# Patient Record
Sex: MALE | Race: WHITE | NOT HISPANIC OR LATINO
[De-identification: names, ages, dates, MRNs, and addresses within clinical notes are randomized per-mention and may not be internally consistent; named-entity substitution may affect disease eponyms.]

---

## 2018-04-16 ENCOUNTER — APPOINTMENT (OUTPATIENT)
Dept: OTOLARYNGOLOGY | Facility: CLINIC | Age: 63
End: 2018-04-16
Payer: COMMERCIAL

## 2018-04-16 VITALS
DIASTOLIC BLOOD PRESSURE: 80 MMHG | HEART RATE: 73 BPM | WEIGHT: 315 LBS | SYSTOLIC BLOOD PRESSURE: 124 MMHG | HEIGHT: 72 IN | BODY MASS INDEX: 42.66 KG/M2

## 2018-04-16 DIAGNOSIS — J32.2 CHRONIC ETHMOIDAL SINUSITIS: ICD-10-CM

## 2018-04-16 PROCEDURE — 99243 OFF/OP CNSLTJ NEW/EST LOW 30: CPT | Mod: 25

## 2018-04-16 PROCEDURE — 31575 DIAGNOSTIC LARYNGOSCOPY: CPT

## 2018-04-16 RX ORDER — LEVOFLOXACIN 500 MG/1
500 TABLET, FILM COATED ORAL DAILY
Qty: 14 | Refills: 2 | Status: ACTIVE | COMMUNITY
Start: 2018-04-16 | End: 1900-01-01

## 2018-05-09 ENCOUNTER — APPOINTMENT (OUTPATIENT)
Dept: OTOLARYNGOLOGY | Facility: CLINIC | Age: 63
End: 2018-05-09
Payer: COMMERCIAL

## 2018-05-09 VITALS
DIASTOLIC BLOOD PRESSURE: 60 MMHG | HEART RATE: 67 BPM | HEIGHT: 72 IN | SYSTOLIC BLOOD PRESSURE: 92 MMHG | BODY MASS INDEX: 40.63 KG/M2 | WEIGHT: 300 LBS

## 2018-05-09 DIAGNOSIS — J34.2 DEVIATED NASAL SEPTUM: ICD-10-CM

## 2018-05-09 PROCEDURE — 99213 OFFICE O/P EST LOW 20 MIN: CPT

## 2018-05-09 RX ORDER — HYDROCODONE BITARTRATE AND HOMATROPINE METHYLBROMIDE 5; 1.5 MG/5ML; MG/5ML
5-1.5 SYRUP ORAL
Qty: 300 | Refills: 0 | Status: ACTIVE | COMMUNITY
Start: 2018-05-09 | End: 1900-01-01

## 2018-05-09 RX ORDER — ATORVASTATIN CALCIUM 40 MG/1
40 TABLET, FILM COATED ORAL
Refills: 0 | Status: ACTIVE | COMMUNITY

## 2018-05-09 RX ORDER — RIZATRIPTAN BENZOATE 10 MG/1
10 TABLET, ORALLY DISINTEGRATING ORAL
Refills: 0 | Status: ACTIVE | COMMUNITY

## 2018-05-09 RX ORDER — PROPRANOLOL HYDROCHLORIDE 120 MG/1
120 CAPSULE, EXTENDED RELEASE ORAL
Refills: 0 | Status: ACTIVE | COMMUNITY

## 2018-05-09 RX ORDER — LANSOPRAZOLE 30 MG/1
30 CAPSULE, DELAYED RELEASE ORAL
Refills: 0 | Status: ACTIVE | COMMUNITY

## 2018-05-09 RX ORDER — TAMOXIFEN CITRATE 20 MG/1
20 TABLET, FILM COATED ORAL
Refills: 0 | Status: ACTIVE | COMMUNITY

## 2018-05-09 RX ORDER — GABAPENTIN 100 MG/1
100 CAPSULE ORAL 4 TIMES DAILY
Qty: 1 | Refills: 3 | Status: ACTIVE | COMMUNITY
Start: 2018-05-09 | End: 1900-01-01

## 2018-06-08 RX ORDER — HYDROCODONE BITARTRATE AND HOMATROPINE METHYLBROMIDE 5; 1.5 MG/5ML; MG/5ML
5-1.5 SYRUP ORAL
Qty: 300 | Refills: 0 | Status: ACTIVE | COMMUNITY
Start: 2018-06-08 | End: 1900-01-01

## 2018-08-07 RX ORDER — HYDROCODONE BITARTRATE AND HOMATROPINE METHYLBROMIDE 5; 1.5 MG/5ML; MG/5ML
5-1.5 SYRUP ORAL
Qty: 300 | Refills: 0 | Status: ACTIVE | COMMUNITY
Start: 2018-08-07 | End: 1900-01-01

## 2018-09-11 RX ORDER — HYDROCODONE BITARTRATE AND HOMATROPINE METHYLBROMIDE 5; 1.5 MG/5ML; MG/5ML
5-1.5 SYRUP ORAL
Qty: 300 | Refills: 0 | Status: ACTIVE | COMMUNITY
Start: 2018-09-11 | End: 1900-01-01

## 2018-11-02 RX ORDER — HYDROCODONE BITARTRATE AND HOMATROPINE METHYLBROMIDE 5; 1.5 MG/5ML; MG/5ML
5-1.5 SYRUP ORAL
Qty: 300 | Refills: 0 | Status: ACTIVE | COMMUNITY
Start: 2018-11-02 | End: 1900-01-01

## 2019-03-10 ENCOUNTER — TRANSCRIPTION ENCOUNTER (OUTPATIENT)
Age: 64
End: 2019-03-10

## 2019-03-26 ENCOUNTER — APPOINTMENT (OUTPATIENT)
Dept: INTERNAL MEDICINE | Facility: CLINIC | Age: 64
End: 2019-03-26
Payer: COMMERCIAL

## 2019-03-26 VITALS
TEMPERATURE: 98.4 F | HEIGHT: 72 IN | HEART RATE: 76 BPM | SYSTOLIC BLOOD PRESSURE: 140 MMHG | WEIGHT: 315 LBS | DIASTOLIC BLOOD PRESSURE: 72 MMHG | RESPIRATION RATE: 16 BRPM | BODY MASS INDEX: 42.66 KG/M2 | OXYGEN SATURATION: 95 %

## 2019-03-26 DIAGNOSIS — R09.82 POSTNASAL DRIP: ICD-10-CM

## 2019-03-26 DIAGNOSIS — I10 ESSENTIAL (PRIMARY) HYPERTENSION: ICD-10-CM

## 2019-03-26 DIAGNOSIS — J45.909 UNSPECIFIED ASTHMA, UNCOMPLICATED: ICD-10-CM

## 2019-03-26 DIAGNOSIS — R93.89 ABNORMAL FINDINGS ON DIAGNOSTIC IMAGING OF OTHER SPECIFIED BODY STRUCTURES: ICD-10-CM

## 2019-03-26 DIAGNOSIS — E78.00 PURE HYPERCHOLESTEROLEMIA, UNSPECIFIED: ICD-10-CM

## 2019-03-26 DIAGNOSIS — R05 COUGH: ICD-10-CM

## 2019-03-26 DIAGNOSIS — R91.8 OTHER NONSPECIFIC ABNORMAL FINDING OF LUNG FIELD: ICD-10-CM

## 2019-03-26 DIAGNOSIS — G47.33 OBSTRUCTIVE SLEEP APNEA (ADULT) (PEDIATRIC): ICD-10-CM

## 2019-03-26 PROCEDURE — 94729 DIFFUSING CAPACITY: CPT

## 2019-03-26 PROCEDURE — 94060 EVALUATION OF WHEEZING: CPT

## 2019-03-26 PROCEDURE — G0447 BEHAVIOR COUNSEL OBESITY 15M: CPT

## 2019-03-26 PROCEDURE — 99244 OFF/OP CNSLTJ NEW/EST MOD 40: CPT | Mod: 25

## 2019-03-26 PROCEDURE — 94727 GAS DIL/WSHOT DETER LNG VOL: CPT

## 2019-03-26 PROCEDURE — ZZZZZ: CPT

## 2019-03-26 RX ORDER — METFORMIN HYDROCHLORIDE 500 MG/1
500 TABLET, COATED ORAL
Refills: 0 | Status: ACTIVE | COMMUNITY
Start: 2019-03-26

## 2019-03-26 RX ORDER — MOMETASONE FUROATE 220 UG/1
220 INHALANT RESPIRATORY (INHALATION)
Qty: 2 | Refills: 3 | Status: ACTIVE | COMMUNITY
Start: 2019-03-26 | End: 1900-01-01

## 2019-03-26 RX ORDER — AMLODIPINE BESYLATE 10 MG/1
10 TABLET ORAL
Refills: 0 | Status: ACTIVE | COMMUNITY
Start: 2019-03-26

## 2019-03-26 RX ORDER — FLUTICASONE PROPIONATE 50 UG/1
50 SPRAY, METERED NASAL DAILY
Qty: 1 | Refills: 2 | Status: ACTIVE | COMMUNITY
Start: 2019-03-26 | End: 1900-01-01

## 2019-03-26 RX ORDER — HYDROCHLOROTHIAZIDE 12.5 MG/1
12.5 CAPSULE ORAL
Refills: 0 | Status: ACTIVE | COMMUNITY
Start: 2019-03-26

## 2019-03-26 NOTE — REVIEW OF SYSTEMS
[Cough] : cough [Fever] : no fever [Chills] : no chills [Sputum] : not coughing up ~M sputum [Dyspnea] : no dyspnea [Dizziness] : no dizziness [Syncope] : no fainting

## 2019-03-26 NOTE — PROCEDURE
[FreeTextEntry1] : Full pulmonary function testing today is within normal limits with an FEV1 of 3.61 or 94% predicted.  diffusing capacity is normal.

## 2019-03-26 NOTE — CONSULT LETTER
[Dear  ___] : Dear ~KATHY, [Consult Letter:] : I had the pleasure of evaluating your patient, [unfilled]. [Please see my note below.] : Please see my note below. [Consult Closing:] : Thank you very much for allowing me to participate in the care of this patient.  If you have any questions, please do not hesitate to contact me. [Sincerely,] : Sincerely, [FreeTextEntry2] : Dr Geovanny Fletcher [FreeTextEntry3] : Randell Villasenor MD

## 2019-03-26 NOTE — ASSESSMENT
[FreeTextEntry1] : #1 small pulmonary nodules as noted above. 2 nodules seen in the left lateral lingular area measuring up to 5 mm appear to be seen on the PET CT scan of September 2014. Alvarado will have his next following CT scan of chest in 6 months. Return after to review results.\par \par #2 chronic cough for about 2 years. Patient has history of postnasal drip. he will use nasal saline spray p.r.n. Also begin Flonase 2 sprays per nostril b.i.d. x5 days and then q.d. Regarding his reflux, go on a GERD regimen, stay upright for at least 3 hours after eating before lying down. No bedtime snacks. Continue Prevacid. He will also begin Asmanex 220, 2 inhalations per day with gargle and for possible component of reactive airways or inflammatory airways disease. Wife instructed to smoke outdoors whenever possible. Will reserve oral steroid course at this time.\par \par #3 Abdomenal lymph nodes noted on CT scan of chest. Felt to be likely reactive. Refer patient to gastroenterology. Pt says will see Dr HARVINDER Rizvi who he has seen before.\par \par #4 obstructive sleep apnea. Continue CPAP treatment\par \par #5 morbid obesity. BMI 45.03. The patient was counseled obesity and instructed on a strict weight reduction diet. I recommended a low-fat, low-cholesterol, portion control, KTAERINE, no concentrated sweets diet, aiming to lose 2 pounds per week. Total time spent on counseling on obesity was 17 minutes.\par \par #6 hypertension. Patient's Olmesartan was recently discontinued 2 weeks ago. Patient states that his cough is improved during this time.\par \par #7 history of breast cancer 2014. No known recurrence. \par \par RV in 2 months.\par \par report sent to Dr Geovanny Fletcher.

## 2019-03-26 NOTE — PHYSICAL EXAM
[General Appearance - Well Developed] : well developed [Normal Appearance] : normal appearance [General Appearance - Well Nourished] : well nourished [No Deformities] : no deformities [General Appearance - In No Acute Distress] : no acute distress [Normal Conjunctiva] : the conjunctiva exhibited no abnormalities [Eyelids - No Xanthelasma] : the eyelids demonstrated no xanthelasmas [Normal Oropharynx] : normal oropharynx [Neck Appearance] : the appearance of the neck was normal [Neck Cervical Mass (___cm)] : no neck mass was observed [Jugular Venous Distention Increased] : there was no jugular-venous distention [Thyroid Diffuse Enlargement] : the thyroid was not enlarged [Thyroid Nodule] : there were no palpable thyroid nodules [Heart Rate And Rhythm] : heart rate and rhythm were normal [Heart Sounds] : normal S1 and S2 [Murmurs] : no murmurs present [Respiration, Rhythm And Depth] : normal respiratory rhythm and effort [Exaggerated Use Of Accessory Muscles For Inspiration] : no accessory muscle use [Auscultation Breath Sounds / Voice Sounds] : lungs were clear to auscultation bilaterally [Abdomen Soft] : soft [Abdomen Tenderness] : non-tender [Nail Clubbing] : no clubbing of the fingernails [Cyanosis, Localized] : no localized cyanosis [Skin Turgor] : normal skin turgor [] : no rash [Impaired Insight] : insight and judgment were intact [Affect] : the affect was normal [FreeTextEntry1] : alert

## 2019-03-26 NOTE — HISTORY OF PRESENT ILLNESS
[FreeTextEntry1] : This is a 63-year-old male who is seen today in pulmonary consultation. He has had a dry cough for approximately 2 years. he says he rarely has wheezing, is not bringing up any sputum, not having any hemoptysis. He is not noting dyspnea on exertion. He is able to walk 10 blocks and climb 2 flights of stairs all right. He is a nonsmoker. His wife smokes and he has had secondhand smoke exposure. Has no known history of asthma or COPD. He does have a history of postnasal drip. Tells me he had allergy testing about 4 years ago and was told that all the tests were okay.  He does have a history of GERD and is maintained on lansoprazole 30 mg per day. He does have snacks at 8 PM and lies down to sleep at 10 PM.  He was instucted to increase his interval to 3 hours between eating and lying down..\par \par He does have obstructive sleep apnea and wears with CPAP every night.\par \par He had a CT scan of chest on 3/12/19 which showed 2  nodules in the lateral left lingular area which were up to 5 mm in diameter. His previous PET/CT scan of September 2014 showed what appear to be similar nodules in the same area. Also on the current CT were tiny punctate nodules in the peripheral right middle lobe, peripheral right upper lobe, and in the peripheral right lower lobe. The upper abdomen on the current CT also showed redemonstration of several similar-appearing peripancreatic periportal and periceliac artery lymph node the largest of which was 2.7 x 1.0 cm at the periportal area and thought to likely be reactive.\par \par Patient states that his olmesartan was discontinued 2 weeks ago and that his cough seems to be better since that time. It is however not resolved fully.\par \par The patient has a history of right breast cancer treated with mastectomy. He tells me he received no radiation therapy or chemotherapy and has no recurrence. He is maintained on Tamoxifen.

## 2019-05-21 ENCOUNTER — APPOINTMENT (OUTPATIENT)
Dept: INTERNAL MEDICINE | Facility: CLINIC | Age: 64
End: 2019-05-21

## 2020-08-22 DIAGNOSIS — Z01.818 ENCOUNTER FOR OTHER PREPROCEDURAL EXAMINATION: ICD-10-CM

## 2020-08-29 ENCOUNTER — APPOINTMENT (OUTPATIENT)
Dept: DISASTER EMERGENCY | Facility: CLINIC | Age: 65
End: 2020-08-29

## 2020-08-30 LAB — SARS-COV-2 N GENE NPH QL NAA+PROBE: NOT DETECTED

## 2020-09-01 ENCOUNTER — RESULT REVIEW (OUTPATIENT)
Age: 65
End: 2020-09-01

## 2020-09-01 ENCOUNTER — OUTPATIENT (OUTPATIENT)
Dept: OUTPATIENT SERVICES | Facility: HOSPITAL | Age: 65
LOS: 1 days | Discharge: HOME | End: 2020-09-01
Payer: COMMERCIAL

## 2020-09-01 VITALS
TEMPERATURE: 97 F | WEIGHT: 315 LBS | OXYGEN SATURATION: 99 % | SYSTOLIC BLOOD PRESSURE: 149 MMHG | RESPIRATION RATE: 19 BRPM | HEART RATE: 73 BPM | DIASTOLIC BLOOD PRESSURE: 87 MMHG | HEIGHT: 72 IN

## 2020-09-01 DIAGNOSIS — D64.9 ANEMIA, UNSPECIFIED: ICD-10-CM

## 2020-09-01 PROCEDURE — 88312 SPECIAL STAINS GROUP 1: CPT

## 2020-09-01 PROCEDURE — C1889: CPT

## 2020-09-01 PROCEDURE — 88305 TISSUE EXAM BY PATHOLOGIST: CPT | Mod: 26

## 2020-09-01 PROCEDURE — 88305 TISSUE EXAM BY PATHOLOGIST: CPT

## 2020-09-01 PROCEDURE — 88312 SPECIAL STAINS GROUP 1: CPT | Mod: 26

## 2020-09-01 NOTE — ASU PATIENT PROFILE, ADULT - PMH
Diabetes mellitus    GERD (gastroesophageal reflux disease)    Hyperlipidemia    Hypertension    Sleep apnea

## 2020-09-04 DIAGNOSIS — E66.01 MORBID (SEVERE) OBESITY DUE TO EXCESS CALORIES: ICD-10-CM

## 2020-09-04 DIAGNOSIS — Z79.84 LONG TERM (CURRENT) USE OF ORAL HYPOGLYCEMIC DRUGS: ICD-10-CM

## 2020-09-04 DIAGNOSIS — D12.2 BENIGN NEOPLASM OF ASCENDING COLON: ICD-10-CM

## 2020-09-04 DIAGNOSIS — E11.9 TYPE 2 DIABETES MELLITUS WITHOUT COMPLICATIONS: ICD-10-CM

## 2020-09-04 DIAGNOSIS — G47.30 SLEEP APNEA, UNSPECIFIED: ICD-10-CM

## 2020-09-04 DIAGNOSIS — K31.7 POLYP OF STOMACH AND DUODENUM: ICD-10-CM

## 2020-09-04 DIAGNOSIS — K21.9 GASTRO-ESOPHAGEAL REFLUX DISEASE WITHOUT ESOPHAGITIS: ICD-10-CM

## 2020-09-04 DIAGNOSIS — K62.1 RECTAL POLYP: ICD-10-CM

## 2020-09-04 DIAGNOSIS — I10 ESSENTIAL (PRIMARY) HYPERTENSION: ICD-10-CM

## 2020-09-04 DIAGNOSIS — D64.9 ANEMIA, UNSPECIFIED: ICD-10-CM

## 2020-09-04 DIAGNOSIS — E78.5 HYPERLIPIDEMIA, UNSPECIFIED: ICD-10-CM

## 2020-09-04 DIAGNOSIS — K57.30 DIVERTICULOSIS OF LARGE INTESTINE WITHOUT PERFORATION OR ABSCESS WITHOUT BLEEDING: ICD-10-CM

## 2021-03-06 ENCOUNTER — TRANSCRIPTION ENCOUNTER (OUTPATIENT)
Age: 66
End: 2021-03-06

## 2021-07-26 ENCOUNTER — EMERGENCY (EMERGENCY)
Facility: HOSPITAL | Age: 66
LOS: 0 days | Discharge: HOME | End: 2021-07-26
Attending: EMERGENCY MEDICINE
Payer: COMMERCIAL

## 2021-07-26 VITALS
DIASTOLIC BLOOD PRESSURE: 78 MMHG | RESPIRATION RATE: 19 BRPM | OXYGEN SATURATION: 100 % | HEART RATE: 66 BPM | TEMPERATURE: 98 F | SYSTOLIC BLOOD PRESSURE: 133 MMHG

## 2021-07-26 VITALS — HEIGHT: 72 IN | WEIGHT: 315 LBS

## 2021-07-26 DIAGNOSIS — I10 ESSENTIAL (PRIMARY) HYPERTENSION: ICD-10-CM

## 2021-07-26 DIAGNOSIS — M54.5 LOW BACK PAIN: ICD-10-CM

## 2021-07-26 DIAGNOSIS — G47.30 SLEEP APNEA, UNSPECIFIED: ICD-10-CM

## 2021-07-26 DIAGNOSIS — Z79.899 OTHER LONG TERM (CURRENT) DRUG THERAPY: ICD-10-CM

## 2021-07-26 DIAGNOSIS — Z91.018 ALLERGY TO OTHER FOODS: ICD-10-CM

## 2021-07-26 DIAGNOSIS — E11.9 TYPE 2 DIABETES MELLITUS WITHOUT COMPLICATIONS: ICD-10-CM

## 2021-07-26 DIAGNOSIS — Z88.8 ALLERGY STATUS TO OTHER DRUGS, MEDICAMENTS AND BIOLOGICAL SUBSTANCES: ICD-10-CM

## 2021-07-26 DIAGNOSIS — Z79.84 LONG TERM (CURRENT) USE OF ORAL HYPOGLYCEMIC DRUGS: ICD-10-CM

## 2021-07-26 DIAGNOSIS — E78.5 HYPERLIPIDEMIA, UNSPECIFIED: ICD-10-CM

## 2021-07-26 DIAGNOSIS — K21.9 GASTRO-ESOPHAGEAL REFLUX DISEASE WITHOUT ESOPHAGITIS: ICD-10-CM

## 2021-07-26 PROCEDURE — 99284 EMERGENCY DEPT VISIT MOD MDM: CPT

## 2021-07-26 PROCEDURE — 99283 EMERGENCY DEPT VISIT LOW MDM: CPT

## 2021-07-26 RX ORDER — IBUPROFEN 200 MG
600 TABLET ORAL ONCE
Refills: 0 | Status: COMPLETED | OUTPATIENT
Start: 2021-07-26 | End: 2021-07-26

## 2021-07-26 RX ORDER — DIAZEPAM 5 MG
1 TABLET ORAL
Qty: 9 | Refills: 0
Start: 2021-07-26

## 2021-07-26 RX ORDER — IBUPROFEN 200 MG
1 TABLET ORAL
Qty: 40 | Refills: 0
Start: 2021-07-26

## 2021-07-26 RX ORDER — DIAZEPAM 5 MG
5 TABLET ORAL ONCE
Refills: 0 | Status: DISCONTINUED | OUTPATIENT
Start: 2021-07-26 | End: 2021-07-26

## 2021-07-26 RX ADMIN — Medication 600 MILLIGRAM(S): at 07:51

## 2021-07-26 RX ADMIN — Medication 5 MILLIGRAM(S): at 07:51

## 2021-07-26 NOTE — ED PROVIDER NOTE - PATIENT PORTAL LINK FT
You can access the FollowMyHealth Patient Portal offered by [facility] by registering at the following website: [email or web address]. By joining [employer]’s FollowMyHealth portal, you will also be able to view your health information using other applications (apps) compatible with our system.

## 2021-07-26 NOTE — ED PROVIDER NOTE - OBJECTIVE STATEMENT
66M hx HTN c/o progressive low back pain x 3d. No weakness/paresth. No bowel/bladder changes. NO fever/chills. No red flag symptoms. No recent fall/injury  Took one dose OTC meds yesterday without relief. Has seen chiropractor for similar pain ~1 year ago.

## 2021-07-26 NOTE — ED PROVIDER NOTE - NSFOLLOWUPINSTRUCTIONS_ED_ALL_ED_FT
Follow-up with your regular physician  Return with any persistent/worsening symptoms.     Back Pain    Back pain is very common in adults. The cause of back pain is rarely dangerous and the pain often gets better over time. The cause of your back pain may not be known and may include strain of muscles or ligaments, degeneration of the spinal disks, or arthritis. Occasionally the pain may radiate down your leg(s). Over-the-counter medicines to reduce pain and inflammation are often the most helpful. Stretching and remaining active frequently helps the healing process.     SEEK IMMEDIATE MEDICAL CARE IF YOU HAVE ANY OF THE FOLLOWING SYMPTOMS: bowel or bladder control problems, unusual weakness or numbness in your arms or legs, nausea or vomiting, abdominal pain, fever, dizziness/lightheadedness.

## 2021-07-26 NOTE — ED PROVIDER NOTE - CARE PROVIDER_API CALL
Geovanny Fletcher  CARDIOVASCULAR DISEASE  [address], [facility] [address]  [place] [address]  Phone: [phone]  Fax: [phone]  Follow Up Time:

## 2021-10-06 PROBLEM — I10 ESSENTIAL HYPERTENSION: Status: ACTIVE | Noted: 2019-03-26

## 2023-05-03 ENCOUNTER — OUTPATIENT (OUTPATIENT)
Dept: OUTPATIENT SERVICES | Facility: HOSPITAL | Age: 68
LOS: 1 days | End: 2023-05-03
Payer: COMMERCIAL

## 2023-05-03 VITALS
WEIGHT: 315 LBS | HEIGHT: 72 IN | SYSTOLIC BLOOD PRESSURE: 132 MMHG | RESPIRATION RATE: 16 BRPM | OXYGEN SATURATION: 98 % | DIASTOLIC BLOOD PRESSURE: 78 MMHG | TEMPERATURE: 97 F | HEART RATE: 62 BPM

## 2023-05-03 DIAGNOSIS — Z29.9 ENCOUNTER FOR PROPHYLACTIC MEASURES, UNSPECIFIED: ICD-10-CM

## 2023-05-03 DIAGNOSIS — M48.02 SPINAL STENOSIS, CERVICAL REGION: ICD-10-CM

## 2023-05-03 DIAGNOSIS — Z98.890 OTHER SPECIFIED POSTPROCEDURAL STATES: Chronic | ICD-10-CM

## 2023-05-03 DIAGNOSIS — Z90.11 ACQUIRED ABSENCE OF RIGHT BREAST AND NIPPLE: Chronic | ICD-10-CM

## 2023-05-03 DIAGNOSIS — Z01.818 ENCOUNTER FOR OTHER PREPROCEDURAL EXAMINATION: ICD-10-CM

## 2023-05-03 LAB
A1C WITH ESTIMATED AVERAGE GLUCOSE RESULT: 6.6 % — HIGH (ref 4–5.6)
ABO RH CONFIRMATION: SIGNIFICANT CHANGE UP
ALBUMIN SERPL ELPH-MCNC: 3.9 G/DL — SIGNIFICANT CHANGE UP (ref 3.3–5)
ALP SERPL-CCNC: 76 U/L — SIGNIFICANT CHANGE UP (ref 40–120)
ALT FLD-CCNC: 80 U/L — HIGH (ref 12–78)
ANION GAP SERPL CALC-SCNC: 2 MMOL/L — LOW (ref 5–17)
ANISOCYTOSIS BLD QL: SLIGHT — SIGNIFICANT CHANGE UP
APPEARANCE UR: CLEAR — SIGNIFICANT CHANGE UP
APTT BLD: 32.9 SEC — SIGNIFICANT CHANGE UP (ref 27.5–35.5)
AST SERPL-CCNC: 54 U/L — HIGH (ref 15–37)
BASOPHILS # BLD AUTO: 0.05 K/UL — SIGNIFICANT CHANGE UP (ref 0–0.2)
BASOPHILS NFR BLD AUTO: 0.7 % — SIGNIFICANT CHANGE UP (ref 0–2)
BILIRUB SERPL-MCNC: 0.7 MG/DL — SIGNIFICANT CHANGE UP (ref 0.2–1.2)
BILIRUB UR-MCNC: NEGATIVE — SIGNIFICANT CHANGE UP
BLD GP AB SCN SERPL QL: SIGNIFICANT CHANGE UP
BUN SERPL-MCNC: 19 MG/DL — SIGNIFICANT CHANGE UP (ref 7–23)
CALCIUM SERPL-MCNC: 9.2 MG/DL — SIGNIFICANT CHANGE UP (ref 8.5–10.1)
CHLORIDE SERPL-SCNC: 110 MMOL/L — HIGH (ref 96–108)
CO2 SERPL-SCNC: 27 MMOL/L — SIGNIFICANT CHANGE UP (ref 22–31)
COLOR SPEC: YELLOW — SIGNIFICANT CHANGE UP
CREAT SERPL-MCNC: 1.08 MG/DL — SIGNIFICANT CHANGE UP (ref 0.5–1.3)
DIFF PNL FLD: NEGATIVE — SIGNIFICANT CHANGE UP
EGFR: 75 ML/MIN/1.73M2 — SIGNIFICANT CHANGE UP
ELLIPTOCYTES BLD QL SMEAR: SLIGHT — SIGNIFICANT CHANGE UP
EOSINOPHIL # BLD AUTO: 0.27 K/UL — SIGNIFICANT CHANGE UP (ref 0–0.5)
EOSINOPHIL NFR BLD AUTO: 3.7 % — SIGNIFICANT CHANGE UP (ref 0–6)
ESTIMATED AVERAGE GLUCOSE: 143 MG/DL — HIGH (ref 68–114)
GLUCOSE SERPL-MCNC: 123 MG/DL — HIGH (ref 70–99)
GLUCOSE UR QL: 1000 MG/DL
HCT VFR BLD CALC: 48.3 % — SIGNIFICANT CHANGE UP (ref 39–50)
HGB BLD-MCNC: 14.8 G/DL — SIGNIFICANT CHANGE UP (ref 13–17)
IMM GRANULOCYTES NFR BLD AUTO: 0.3 % — SIGNIFICANT CHANGE UP (ref 0–0.9)
INR BLD: 1.11 RATIO — SIGNIFICANT CHANGE UP (ref 0.88–1.16)
KETONES UR-MCNC: NEGATIVE — SIGNIFICANT CHANGE UP
LEUKOCYTE ESTERASE UR-ACNC: NEGATIVE — SIGNIFICANT CHANGE UP
LYMPHOCYTES # BLD AUTO: 1.88 K/UL — SIGNIFICANT CHANGE UP (ref 1–3.3)
LYMPHOCYTES # BLD AUTO: 25.9 % — SIGNIFICANT CHANGE UP (ref 13–44)
MANUAL SMEAR VERIFICATION: SIGNIFICANT CHANGE UP
MCHC RBC-ENTMCNC: 24.5 PG — LOW (ref 27–34)
MCHC RBC-ENTMCNC: 30.6 GM/DL — LOW (ref 32–36)
MCV RBC AUTO: 79.8 FL — LOW (ref 80–100)
MONOCYTES # BLD AUTO: 0.68 K/UL — SIGNIFICANT CHANGE UP (ref 0–0.9)
MONOCYTES NFR BLD AUTO: 9.4 % — SIGNIFICANT CHANGE UP (ref 2–14)
MRSA PCR RESULT.: SIGNIFICANT CHANGE UP
NEUTROPHILS # BLD AUTO: 4.35 K/UL — SIGNIFICANT CHANGE UP (ref 1.8–7.4)
NEUTROPHILS NFR BLD AUTO: 60 % — SIGNIFICANT CHANGE UP (ref 43–77)
NITRITE UR-MCNC: NEGATIVE — SIGNIFICANT CHANGE UP
PH UR: 5 — SIGNIFICANT CHANGE UP (ref 5–8)
PLAT MORPH BLD: NORMAL — SIGNIFICANT CHANGE UP
PLATELET # BLD AUTO: 97 K/UL — LOW (ref 150–400)
POIKILOCYTOSIS BLD QL AUTO: SLIGHT — SIGNIFICANT CHANGE UP
POTASSIUM SERPL-MCNC: 4.3 MMOL/L — SIGNIFICANT CHANGE UP (ref 3.5–5.3)
POTASSIUM SERPL-SCNC: 4.3 MMOL/L — SIGNIFICANT CHANGE UP (ref 3.5–5.3)
PROT SERPL-MCNC: 8.1 GM/DL — SIGNIFICANT CHANGE UP (ref 6–8.3)
PROT UR-MCNC: NEGATIVE — SIGNIFICANT CHANGE UP
PROTHROM AB SERPL-ACNC: 12.9 SEC — SIGNIFICANT CHANGE UP (ref 10.5–13.4)
RBC # BLD: 6.05 M/UL — HIGH (ref 4.2–5.8)
RBC # FLD: 17.6 % — HIGH (ref 10.3–14.5)
RBC BLD AUTO: ABNORMAL
S AUREUS DNA NOSE QL NAA+PROBE: SIGNIFICANT CHANGE UP
SODIUM SERPL-SCNC: 139 MMOL/L — SIGNIFICANT CHANGE UP (ref 135–145)
SP GR SPEC: 1.02 — SIGNIFICANT CHANGE UP (ref 1.01–1.02)
UROBILINOGEN FLD QL: NEGATIVE — SIGNIFICANT CHANGE UP
WBC # BLD: 7.25 K/UL — SIGNIFICANT CHANGE UP (ref 3.8–10.5)
WBC # FLD AUTO: 7.25 K/UL — SIGNIFICANT CHANGE UP (ref 3.8–10.5)

## 2023-05-03 PROCEDURE — 87086 URINE CULTURE/COLONY COUNT: CPT

## 2023-05-03 PROCEDURE — 93010 ELECTROCARDIOGRAM REPORT: CPT

## 2023-05-03 PROCEDURE — 85610 PROTHROMBIN TIME: CPT

## 2023-05-03 PROCEDURE — 87641 MR-STAPH DNA AMP PROBE: CPT

## 2023-05-03 PROCEDURE — 93005 ELECTROCARDIOGRAM TRACING: CPT

## 2023-05-03 PROCEDURE — 81003 URINALYSIS AUTO W/O SCOPE: CPT

## 2023-05-03 PROCEDURE — 85025 COMPLETE CBC W/AUTO DIFF WBC: CPT

## 2023-05-03 PROCEDURE — 86850 RBC ANTIBODY SCREEN: CPT

## 2023-05-03 PROCEDURE — 87640 STAPH A DNA AMP PROBE: CPT

## 2023-05-03 PROCEDURE — 36415 COLL VENOUS BLD VENIPUNCTURE: CPT

## 2023-05-03 PROCEDURE — 86901 BLOOD TYPING SEROLOGIC RH(D): CPT

## 2023-05-03 PROCEDURE — 80053 COMPREHEN METABOLIC PANEL: CPT

## 2023-05-03 PROCEDURE — 86900 BLOOD TYPING SEROLOGIC ABO: CPT

## 2023-05-03 PROCEDURE — 71046 X-RAY EXAM CHEST 2 VIEWS: CPT | Mod: 26

## 2023-05-03 PROCEDURE — 85730 THROMBOPLASTIN TIME PARTIAL: CPT

## 2023-05-03 PROCEDURE — 71046 X-RAY EXAM CHEST 2 VIEWS: CPT

## 2023-05-03 PROCEDURE — 83036 HEMOGLOBIN GLYCOSYLATED A1C: CPT

## 2023-05-03 PROCEDURE — 99214 OFFICE O/P EST MOD 30 MIN: CPT | Mod: 25

## 2023-05-03 RX ORDER — HYDROCHLOROTHIAZIDE 25 MG
0 TABLET ORAL
Qty: 0 | Refills: 0 | DISCHARGE

## 2023-05-03 RX ORDER — METFORMIN HYDROCHLORIDE 850 MG/1
1 TABLET ORAL
Refills: 0 | DISCHARGE

## 2023-05-03 RX ORDER — LANSOPRAZOLE 15 MG/1
0 CAPSULE, DELAYED RELEASE ORAL
Qty: 0 | Refills: 0 | DISCHARGE

## 2023-05-03 RX ORDER — PROPRANOLOL HCL 160 MG
0 CAPSULE, EXTENDED RELEASE 24HR ORAL
Qty: 0 | Refills: 0 | DISCHARGE

## 2023-05-03 RX ORDER — METFORMIN HYDROCHLORIDE 850 MG/1
1 TABLET ORAL
Qty: 0 | Refills: 0 | DISCHARGE

## 2023-05-03 RX ORDER — LEVOTHYROXINE SODIUM 125 MCG
0 TABLET ORAL
Qty: 0 | Refills: 0 | DISCHARGE

## 2023-05-03 RX ORDER — FUROSEMIDE 40 MG
0 TABLET ORAL
Qty: 0 | Refills: 0 | DISCHARGE

## 2023-05-03 RX ORDER — PROPRANOLOL HCL 160 MG
1 CAPSULE, EXTENDED RELEASE 24HR ORAL
Refills: 0 | DISCHARGE

## 2023-05-03 RX ORDER — FERROUS SULFATE 325(65) MG
1 TABLET ORAL
Qty: 0 | Refills: 0 | DISCHARGE

## 2023-05-03 NOTE — H&P PST ADULT - HISTORY OF PRESENT ILLNESS
68 y.o WD, WN male presents to PST  68 y.o WD, WN male presents to PST with hx of neck pain. Patient reports waking up back in November 2022 with severe neck pain, radiating down left shoulder and arm. He was evaluated with diagnostics revealing cervical herniated disc, stenosis and per patient bone spurs. He has tried conservative treatment without relief. Patient is now opting for surgical intervention. Scheduled for Anterior Cervical Discectomy and Fusion, C4/5, C5/6, C6/7, Anterior Plating , Prosthetic Spacers and Local Autograft

## 2023-05-03 NOTE — H&P PST ADULT - ASSESSMENT
68 y.o male scheduled for Anterior Cervical Discectomy and Fusion, C4/5, C5/6, C6/7, Anterior Plating , Prosthetic Spacers and Local Autograft  Plan  1. Stop all NSAIDS, herbal supplements and vitamins for 7 days.  2. NPO at midnight.  3. Take the following medications ( ) with small sips of water on the morning of your procedure/surgery.  4. Use EZ sponges as directed  5. Use mupirocin as directed  6. Labs, EKG, CXR a sper surgeon  7. PMD/cardiologist visit for optimization prior to surgery as per surgeon.     68 y.o male scheduled for Anterior Cervical Discectomy and Fusion, C4/5, C5/6, C6/7, Anterior Plating , Prosthetic Spacers and Local Autograft  Plan  1. Stop all NSAIDS, herbal supplements and vitamins for 7 days.  2. NPO at midnight.  3. Take the following medications --L-Thyroxine  with small sips of water on the morning of your procedure/surgery.  4. Use EZ sponges as directed  5. Use mupirocin as directed  6. Labs, EKG, CXR a sper surgeon  7. PMD/cardiologist visit for optimization prior to surgery as per surgeon.     68 y.o male scheduled for Anterior Cervical Discectomy and Fusion, C4/5, C5/6, C6/7, Anterior Plating , Prosthetic Spacers and Local Autograft  Plan  1. Stop all NSAIDS, herbal supplements and vitamins for 7 days. Hold Jardiance 3 days prior to surgery .   2. NPO at midnight.  3. Take the following medications --L-Thyroxine--  with small sips of water on the morning of your procedure/surgery.  4. Use EZ sponges as directed  5. Use mupirocin as directed  6. Labs, EKG, CXR as per surgeon  7. PMD/cardiologist JESS Fletcher visit for optimization prior to surgery as per surgeon.  8. Preprocedure education provided    CAPRINI SCORE    AGE RELATED RISK FACTORS                                                       MOBILITY RELATED FACTORS  [ ] Age 41-60 years                                            (1 Point)                  [ ] Bed rest                                                        (1 Point)  [x Age: 61-74 years                                           (2 Points)                [ ] Plaster cast                                                   (2 Points)  [ ] Age= 75 years                                              (3 Points)                 [ ] Bed bound for more than 72 hours                   (2 Points)    DISEASE RELATED RISK FACTORS                                               GENDER SPECIFIC FACTORS  [x ] Edema in the lower extremities                       (1 Point)                  [ ] Pregnancy                                                     (1 Point)  [ ] Varicose veins                                               (1 Point)                  [ ] Post-partum < 6 weeks                                   (1 Point)             [x ] BMI > 25 Kg/m2                                            (1 Point)                  [ ] Hormonal therapy  or oral contraception            (1 Point)                 [ ] Sepsis (in the previous month)                        (1 Point)                  [ ] History of pregnancy complications  [ ] Pneumonia or serious lung disease                                               [ ] Unexplained or recurrent                       (1 Point)           (in the previous month)                               (1 Point)  [ ] Abnormal pulmonary function test                     (1 Point)                 SURGERY RELATED RISK FACTORS  [ ] Acute myocardial infarction                              (1 Point)                 [ ]  Section                                            (1 Point)  [ ] Congestive heart failure (in the previous month)  (1 Point)                 [ ] Minor surgery                                                 (1 Point)   [ ] Inflammatory bowel disease                             (1 Point)                 [ ] Arthroscopic surgery                                        (2 Points)  [ ] Central venous access                                    (2 Points)                [ x] General surgery lasting more than 45 minutes   (2 Points)       [ ] Stroke (in the previous month)                          (5 Points)               [ ] Elective arthroplasty                                        (5 Points)                                                                                                                                               HEMATOLOGY RELATED FACTORS                                                 TRAUMA RELATED RISK FACTORS  [ ] Prior episodes of VTE                                     (3 Points)                 [ ] Fracture of the hip, pelvis, or leg                       (5 Points)  [ ] Positive family history for VTE                         (3 Points)                 [ ] Acute spinal cord injury (in the previous month)  (5 Points)  [ ] Prothrombin 80090 A                                      (3 Points)                 [ ] Paralysis  (less than 1 month)                          (5 Points)  [ ] Factor V Leiden                                             (3 Points)                 [ ] Multiple Trauma within 1 month                         (5 Points)  [ ] Lupus anticoagulants                                     (3 Points)                                                           [ ] Anticardiolipin antibodies                                (3 Points)                                                       [ ] High homocysteine in the blood                      (3 Points)                                             [ ] Other congenital or acquired thrombophilia       (3 Points)                                                [ ] Heparin induced thrombocytopenia                  (3 Points)                                          Total Score [      6    ]    The Caprini score indicates that this patient is at high risk for a VTE event (score > = 6).    Surgical patients in this group will benefit from both pharmacologic prophylaxis and intermittent compression devices.    The surgical team will determine the balance between VTE risk and bleeding risk, and other clinical considerations.

## 2023-05-03 NOTE — H&P PST ADULT - NSICDXPASTMEDICALHX_GEN_ALL_CORE_FT
PAST MEDICAL HISTORY:  Cervical herniated disc     Cervical radiculopathy     Cervical spinal stenosis     GERD (gastroesophageal reflux disease)     Hyperlipidemia     Hypertension     Sleep apnea     Type II diabetes mellitus      PAST MEDICAL HISTORY:  Benign essential tremor     Breast cancer, right     Cervical herniated disc     Cervical radiculopathy     Cervical spinal stenosis     Chronic coughing     GERD (gastroesophageal reflux disease)     Hearing loss     Hyperlipidemia     Hypertension     Numbness of left hand     Obesity, morbid, BMI 40.0-49.9     Sleep apnea     Type II diabetes mellitus

## 2023-05-03 NOTE — H&P PST ADULT - CARDIOVASCULAR COMMENTS
preop evaluation done with JESS Fletcher (PCP/Cardiology) for optimization for surgery; HTN off medication for "few years"

## 2023-05-03 NOTE — H&P PST ADULT - NSICDXPASTSURGICALHX_GEN_ALL_CORE_FT
PAST SURGICAL HISTORY:  No significant past surgical history      PAST SURGICAL HISTORY:  H/O colonoscopy     History of carpal tunnel surgery of left wrist     History of esophagogastroduodenoscopy (EGD)     History of excision of pilonidal cyst     History of right mastectomy

## 2023-05-04 DIAGNOSIS — M48.02 SPINAL STENOSIS, CERVICAL REGION: ICD-10-CM

## 2023-05-04 DIAGNOSIS — Z01.818 ENCOUNTER FOR OTHER PREPROCEDURAL EXAMINATION: ICD-10-CM

## 2023-05-04 PROBLEM — E66.01 MORBID (SEVERE) OBESITY DUE TO EXCESS CALORIES: Chronic | Status: ACTIVE | Noted: 2023-05-03

## 2023-05-04 PROBLEM — R20.0 ANESTHESIA OF SKIN: Chronic | Status: ACTIVE | Noted: 2023-05-03

## 2023-05-04 PROBLEM — M54.12 RADICULOPATHY, CERVICAL REGION: Chronic | Status: ACTIVE | Noted: 2023-05-03

## 2023-05-04 PROBLEM — G25.0 ESSENTIAL TREMOR: Chronic | Status: ACTIVE | Noted: 2023-05-03

## 2023-05-04 PROBLEM — M50.20 OTHER CERVICAL DISC DISPLACEMENT, UNSPECIFIED CERVICAL REGION: Chronic | Status: ACTIVE | Noted: 2023-05-03

## 2023-05-04 PROBLEM — E11.9 TYPE 2 DIABETES MELLITUS WITHOUT COMPLICATIONS: Chronic | Status: ACTIVE | Noted: 2023-05-03

## 2023-05-04 PROBLEM — C50.911 MALIGNANT NEOPLASM OF UNSPECIFIED SITE OF RIGHT FEMALE BREAST: Chronic | Status: ACTIVE | Noted: 2023-05-03

## 2023-05-04 PROBLEM — H91.90 UNSPECIFIED HEARING LOSS, UNSPECIFIED EAR: Chronic | Status: ACTIVE | Noted: 2023-05-03

## 2023-05-04 PROBLEM — R05.3 CHRONIC COUGH: Chronic | Status: ACTIVE | Noted: 2023-05-03

## 2023-05-05 LAB
CULTURE RESULTS: SIGNIFICANT CHANGE UP
SPECIMEN SOURCE: SIGNIFICANT CHANGE UP

## 2023-05-09 ENCOUNTER — OUTPATIENT (OUTPATIENT)
Dept: INPATIENT UNIT | Facility: HOSPITAL | Age: 68
LOS: 1 days | Discharge: HOME | End: 2023-05-09
Payer: COMMERCIAL

## 2023-05-09 VITALS
OXYGEN SATURATION: 97 % | TEMPERATURE: 98 F | RESPIRATION RATE: 16 BRPM | WEIGHT: 315 LBS | DIASTOLIC BLOOD PRESSURE: 66 MMHG | SYSTOLIC BLOOD PRESSURE: 129 MMHG | HEART RATE: 60 BPM

## 2023-05-09 DIAGNOSIS — Z98.890 OTHER SPECIFIED POSTPROCEDURAL STATES: Chronic | ICD-10-CM

## 2023-05-09 DIAGNOSIS — Z90.11 ACQUIRED ABSENCE OF RIGHT BREAST AND NIPPLE: Chronic | ICD-10-CM

## 2023-05-09 DIAGNOSIS — M48.02 SPINAL STENOSIS, CERVICAL REGION: ICD-10-CM

## 2023-05-09 LAB
GLUCOSE BLDC GLUCOMTR-MCNC: 135 MG/DL — HIGH (ref 70–99)
GLUCOSE BLDC GLUCOMTR-MCNC: 137 MG/DL — HIGH (ref 70–99)
GLUCOSE BLDC GLUCOMTR-MCNC: 138 MG/DL — HIGH (ref 70–99)
GLUCOSE BLDC GLUCOMTR-MCNC: 196 MG/DL — HIGH (ref 70–99)

## 2023-05-09 PROCEDURE — 82962 GLUCOSE BLOOD TEST: CPT

## 2023-05-09 PROCEDURE — 85027 COMPLETE CBC AUTOMATED: CPT

## 2023-05-09 PROCEDURE — 84484 ASSAY OF TROPONIN QUANT: CPT

## 2023-05-09 PROCEDURE — 97162 PT EVAL MOD COMPLEX 30 MIN: CPT | Mod: GP

## 2023-05-09 PROCEDURE — C1889: CPT

## 2023-05-09 PROCEDURE — 97116 GAIT TRAINING THERAPY: CPT | Mod: GP

## 2023-05-09 PROCEDURE — 88304 TISSUE EXAM BY PATHOLOGIST: CPT

## 2023-05-09 PROCEDURE — 36415 COLL VENOUS BLD VENIPUNCTURE: CPT

## 2023-05-09 PROCEDURE — 88304 TISSUE EXAM BY PATHOLOGIST: CPT | Mod: 26

## 2023-05-09 PROCEDURE — 93005 ELECTROCARDIOGRAM TRACING: CPT

## 2023-05-09 PROCEDURE — 80048 BASIC METABOLIC PNL TOTAL CA: CPT

## 2023-05-09 PROCEDURE — C1713: CPT

## 2023-05-09 PROCEDURE — 76000 FLUOROSCOPY <1 HR PHYS/QHP: CPT

## 2023-05-09 RX ORDER — CELECOXIB 200 MG/1
200 CAPSULE ORAL ONCE
Refills: 0 | Status: COMPLETED | OUTPATIENT
Start: 2023-05-09 | End: 2023-05-09

## 2023-05-09 RX ORDER — LEVOTHYROXINE SODIUM 125 MCG
50 TABLET ORAL DAILY
Refills: 0 | Status: DISCONTINUED | OUTPATIENT
Start: 2023-05-09 | End: 2023-05-10

## 2023-05-09 RX ORDER — ACETAMINOPHEN 500 MG
1000 TABLET ORAL ONCE
Refills: 0 | Status: COMPLETED | OUTPATIENT
Start: 2023-05-09 | End: 2023-05-09

## 2023-05-09 RX ORDER — MAGNESIUM HYDROXIDE 400 MG/1
30 TABLET, CHEWABLE ORAL EVERY 12 HOURS
Refills: 0 | Status: DISCONTINUED | OUTPATIENT
Start: 2023-05-09 | End: 2023-05-10

## 2023-05-09 RX ORDER — HYDROMORPHONE HYDROCHLORIDE 2 MG/ML
0.5 INJECTION INTRAMUSCULAR; INTRAVENOUS; SUBCUTANEOUS
Refills: 0 | Status: DISCONTINUED | OUTPATIENT
Start: 2023-05-09 | End: 2023-05-09

## 2023-05-09 RX ORDER — ACETAMINOPHEN 500 MG
1000 TABLET ORAL ONCE
Refills: 0 | Status: COMPLETED | OUTPATIENT
Start: 2023-05-10 | End: 2023-05-10

## 2023-05-09 RX ORDER — OXYCODONE HYDROCHLORIDE 5 MG/1
5 TABLET ORAL EVERY 4 HOURS
Refills: 0 | Status: DISCONTINUED | OUTPATIENT
Start: 2023-05-09 | End: 2023-05-10

## 2023-05-09 RX ORDER — FENTANYL CITRATE 50 UG/ML
50 INJECTION INTRAVENOUS
Refills: 0 | Status: DISCONTINUED | OUTPATIENT
Start: 2023-05-09 | End: 2023-05-09

## 2023-05-09 RX ORDER — TRANEXAMIC ACID 100 MG/ML
1 INJECTION, SOLUTION INTRAVENOUS
Qty: 1000 | Refills: 0 | Status: DISCONTINUED | OUTPATIENT
Start: 2023-05-09 | End: 2023-05-10

## 2023-05-09 RX ORDER — OXYCODONE HYDROCHLORIDE 5 MG/1
10 TABLET ORAL EVERY 4 HOURS
Refills: 0 | Status: DISCONTINUED | OUTPATIENT
Start: 2023-05-09 | End: 2023-05-10

## 2023-05-09 RX ORDER — OXYCODONE HYDROCHLORIDE 5 MG/1
10 TABLET ORAL ONCE
Refills: 0 | Status: DISCONTINUED | OUTPATIENT
Start: 2023-05-09 | End: 2023-05-09

## 2023-05-09 RX ORDER — DEXAMETHASONE 0.5 MG/5ML
4 ELIXIR ORAL ONCE
Refills: 0 | Status: DISCONTINUED | OUTPATIENT
Start: 2023-05-09 | End: 2023-05-09

## 2023-05-09 RX ORDER — ONDANSETRON 8 MG/1
4 TABLET, FILM COATED ORAL ONCE
Refills: 0 | Status: DISCONTINUED | OUTPATIENT
Start: 2023-05-09 | End: 2023-05-09

## 2023-05-09 RX ORDER — CEFAZOLIN SODIUM 1 G
3000 VIAL (EA) INJECTION EVERY 8 HOURS
Refills: 0 | Status: COMPLETED | OUTPATIENT
Start: 2023-05-09 | End: 2023-05-10

## 2023-05-09 RX ORDER — CYCLOBENZAPRINE HYDROCHLORIDE 10 MG/1
10 TABLET, FILM COATED ORAL EVERY 8 HOURS
Refills: 0 | Status: DISCONTINUED | OUTPATIENT
Start: 2023-05-09 | End: 2023-05-10

## 2023-05-09 RX ORDER — SODIUM CHLORIDE 9 MG/ML
1000 INJECTION, SOLUTION INTRAVENOUS
Refills: 0 | Status: DISCONTINUED | OUTPATIENT
Start: 2023-05-09 | End: 2023-05-09

## 2023-05-09 RX ORDER — GABAPENTIN 400 MG/1
300 CAPSULE ORAL THREE TIMES A DAY
Refills: 0 | Status: DISCONTINUED | OUTPATIENT
Start: 2023-05-09 | End: 2023-05-10

## 2023-05-09 RX ORDER — DEXAMETHASONE 0.5 MG/5ML
4 ELIXIR ORAL ONCE
Refills: 0 | Status: COMPLETED | OUTPATIENT
Start: 2023-05-10 | End: 2023-05-10

## 2023-05-09 RX ORDER — METFORMIN HYDROCHLORIDE 850 MG/1
1000 TABLET ORAL DAILY
Refills: 0 | Status: DISCONTINUED | OUTPATIENT
Start: 2023-05-09 | End: 2023-05-10

## 2023-05-09 RX ORDER — ATORVASTATIN CALCIUM 80 MG/1
40 TABLET, FILM COATED ORAL AT BEDTIME
Refills: 0 | Status: DISCONTINUED | OUTPATIENT
Start: 2023-05-09 | End: 2023-05-10

## 2023-05-09 RX ORDER — HYDROMORPHONE HYDROCHLORIDE 2 MG/ML
0.5 INJECTION INTRAMUSCULAR; INTRAVENOUS; SUBCUTANEOUS ONCE
Refills: 0 | Status: DISCONTINUED | OUTPATIENT
Start: 2023-05-09 | End: 2023-05-09

## 2023-05-09 RX ORDER — TRANEXAMIC ACID 100 MG/ML
1000 INJECTION, SOLUTION INTRAVENOUS ONCE
Refills: 0 | Status: DISCONTINUED | OUTPATIENT
Start: 2023-05-09 | End: 2023-05-10

## 2023-05-09 RX ORDER — SENNA PLUS 8.6 MG/1
2 TABLET ORAL AT BEDTIME
Refills: 0 | Status: DISCONTINUED | OUTPATIENT
Start: 2023-05-09 | End: 2023-05-10

## 2023-05-09 RX ORDER — GABAPENTIN 400 MG/1
300 CAPSULE ORAL ONCE
Refills: 0 | Status: COMPLETED | OUTPATIENT
Start: 2023-05-09 | End: 2023-05-09

## 2023-05-09 RX ORDER — OXYCODONE HYDROCHLORIDE 5 MG/1
10 TABLET ORAL ONCE
Refills: 0 | Status: COMPLETED | OUTPATIENT
Start: 2023-05-09 | End: 2023-05-09

## 2023-05-09 RX ORDER — PANTOPRAZOLE SODIUM 20 MG/1
40 TABLET, DELAYED RELEASE ORAL
Refills: 0 | Status: DISCONTINUED | OUTPATIENT
Start: 2023-05-09 | End: 2023-05-10

## 2023-05-09 RX ORDER — SODIUM CHLORIDE 9 MG/ML
1000 INJECTION, SOLUTION INTRAVENOUS
Refills: 0 | Status: DISCONTINUED | OUTPATIENT
Start: 2023-05-09 | End: 2023-05-10

## 2023-05-09 RX ADMIN — CYCLOBENZAPRINE HYDROCHLORIDE 10 MILLIGRAM(S): 10 TABLET, FILM COATED ORAL at 21:26

## 2023-05-09 RX ADMIN — ATORVASTATIN CALCIUM 40 MILLIGRAM(S): 80 TABLET, FILM COATED ORAL at 21:26

## 2023-05-09 RX ADMIN — SENNA PLUS 2 TABLET(S): 8.6 TABLET ORAL at 21:26

## 2023-05-09 RX ADMIN — HYDROMORPHONE HYDROCHLORIDE 0.5 MILLIGRAM(S): 2 INJECTION INTRAMUSCULAR; INTRAVENOUS; SUBCUTANEOUS at 16:11

## 2023-05-09 RX ADMIN — GABAPENTIN 300 MILLIGRAM(S): 400 CAPSULE ORAL at 21:26

## 2023-05-09 RX ADMIN — HYDROMORPHONE HYDROCHLORIDE 0.5 MILLIGRAM(S): 2 INJECTION INTRAMUSCULAR; INTRAVENOUS; SUBCUTANEOUS at 16:19

## 2023-05-09 RX ADMIN — GABAPENTIN 300 MILLIGRAM(S): 400 CAPSULE ORAL at 09:58

## 2023-05-09 RX ADMIN — Medication 1000 MILLIGRAM(S): at 21:33

## 2023-05-09 RX ADMIN — Medication 400 MILLIGRAM(S): at 21:26

## 2023-05-09 RX ADMIN — HYDROMORPHONE HYDROCHLORIDE 0.5 MILLIGRAM(S): 2 INJECTION INTRAMUSCULAR; INTRAVENOUS; SUBCUTANEOUS at 16:24

## 2023-05-09 RX ADMIN — HYDROMORPHONE HYDROCHLORIDE 0.5 MILLIGRAM(S): 2 INJECTION INTRAMUSCULAR; INTRAVENOUS; SUBCUTANEOUS at 15:50

## 2023-05-09 RX ADMIN — OXYCODONE HYDROCHLORIDE 10 MILLIGRAM(S): 5 TABLET ORAL at 16:30

## 2023-05-09 RX ADMIN — Medication 200 MILLIGRAM(S): at 20:27

## 2023-05-09 RX ADMIN — CELECOXIB 200 MILLIGRAM(S): 200 CAPSULE ORAL at 09:58

## 2023-05-09 NOTE — PATIENT PROFILE ADULT - FALL HARM RISK - UNIVERSAL INTERVENTIONS
Bed in lowest position, wheels locked, appropriate side rails in place/Call bell, personal items and telephone in reach/Instruct patient to call for assistance before getting out of bed or chair/Non-slip footwear when patient is out of bed/[place] to call system/Physically safe environment - no spills, clutter or unnecessary equipment/Purposeful Proactive Rounding/Room/bathroom lighting operational, light cord in reach

## 2023-05-09 NOTE — BRIEF OPERATIVE NOTE - NSICDXBRIEFPROCEDURE_GEN_ALL_CORE_FT
PROCEDURES:  Anterior cervical discectomy with fusion 3 levels 09-May-2023 15:17:51  Keena Garza E

## 2023-05-09 NOTE — ASU PREOP CHECKLIST - ALLERGIES REVIEWED
done No indication for decadron/remdesivir  CXR as above, infiltrate b/l lower lobes, pleural effusion R>L  s/p intubation and extubation 7/6.    saturating well on NC 2L  MRSA PCR +, treated with Chlorhexidine and mupirocin  Encourage to lay on left side with right side up. Right chest tube to suction.  Continue with bronchodilators and ICS  Serial CXRs.

## 2023-05-10 ENCOUNTER — TRANSCRIPTION ENCOUNTER (OUTPATIENT)
Age: 68
End: 2023-05-10

## 2023-05-10 VITALS
DIASTOLIC BLOOD PRESSURE: 70 MMHG | TEMPERATURE: 98 F | OXYGEN SATURATION: 97 % | RESPIRATION RATE: 18 BRPM | HEART RATE: 73 BPM | SYSTOLIC BLOOD PRESSURE: 126 MMHG

## 2023-05-10 LAB
ANION GAP SERPL CALC-SCNC: 5 MMOL/L — SIGNIFICANT CHANGE UP (ref 5–17)
BUN SERPL-MCNC: 14 MG/DL — SIGNIFICANT CHANGE UP (ref 7–23)
CALCIUM SERPL-MCNC: 9.3 MG/DL — SIGNIFICANT CHANGE UP (ref 8.5–10.1)
CHLORIDE SERPL-SCNC: 107 MMOL/L — SIGNIFICANT CHANGE UP (ref 96–108)
CO2 SERPL-SCNC: 28 MMOL/L — SIGNIFICANT CHANGE UP (ref 22–31)
CREAT SERPL-MCNC: 1.16 MG/DL — SIGNIFICANT CHANGE UP (ref 0.5–1.3)
EGFR: 69 ML/MIN/1.73M2 — SIGNIFICANT CHANGE UP
GLUCOSE BLDC GLUCOMTR-MCNC: 129 MG/DL — HIGH (ref 70–99)
GLUCOSE SERPL-MCNC: 181 MG/DL — HIGH (ref 70–99)
HCT VFR BLD CALC: 47 % — SIGNIFICANT CHANGE UP (ref 39–50)
HGB BLD-MCNC: 14.7 G/DL — SIGNIFICANT CHANGE UP (ref 13–17)
MCHC RBC-ENTMCNC: 25 PG — LOW (ref 27–34)
MCHC RBC-ENTMCNC: 31.3 GM/DL — LOW (ref 32–36)
MCV RBC AUTO: 79.8 FL — LOW (ref 80–100)
PLATELET # BLD AUTO: 99 K/UL — LOW (ref 150–400)
POTASSIUM SERPL-MCNC: 3.8 MMOL/L — SIGNIFICANT CHANGE UP (ref 3.5–5.3)
POTASSIUM SERPL-SCNC: 3.8 MMOL/L — SIGNIFICANT CHANGE UP (ref 3.5–5.3)
RBC # BLD: 5.89 M/UL — HIGH (ref 4.2–5.8)
RBC # FLD: 16.6 % — HIGH (ref 10.3–14.5)
SODIUM SERPL-SCNC: 140 MMOL/L — SIGNIFICANT CHANGE UP (ref 135–145)
TROPONIN I, HIGH SENSITIVITY RESULT: 8.04 NG/L — SIGNIFICANT CHANGE UP
TROPONIN I, HIGH SENSITIVITY RESULT: 9.85 NG/L — SIGNIFICANT CHANGE UP
WBC # BLD: 12.87 K/UL — HIGH (ref 3.8–10.5)
WBC # FLD AUTO: 12.87 K/UL — HIGH (ref 3.8–10.5)

## 2023-05-10 PROCEDURE — 99204 OFFICE O/P NEW MOD 45 MIN: CPT

## 2023-05-10 PROCEDURE — 93010 ELECTROCARDIOGRAM REPORT: CPT

## 2023-05-10 RX ORDER — GABAPENTIN 400 MG/1
1 CAPSULE ORAL
Qty: 0 | Refills: 0 | DISCHARGE
Start: 2023-05-10

## 2023-05-10 RX ORDER — CYCLOBENZAPRINE HYDROCHLORIDE 10 MG/1
1 TABLET, FILM COATED ORAL
Qty: 0 | Refills: 0 | DISCHARGE
Start: 2023-05-10

## 2023-05-10 RX ADMIN — CYCLOBENZAPRINE HYDROCHLORIDE 10 MILLIGRAM(S): 10 TABLET, FILM COATED ORAL at 14:01

## 2023-05-10 RX ADMIN — Medication 50 MICROGRAM(S): at 05:46

## 2023-05-10 RX ADMIN — CYCLOBENZAPRINE HYDROCHLORIDE 10 MILLIGRAM(S): 10 TABLET, FILM COATED ORAL at 05:46

## 2023-05-10 RX ADMIN — Medication 4 MILLIGRAM(S): at 11:21

## 2023-05-10 RX ADMIN — GABAPENTIN 300 MILLIGRAM(S): 400 CAPSULE ORAL at 05:45

## 2023-05-10 RX ADMIN — Medication 1000 MILLIGRAM(S): at 06:15

## 2023-05-10 RX ADMIN — Medication 400 MILLIGRAM(S): at 05:45

## 2023-05-10 RX ADMIN — PANTOPRAZOLE SODIUM 40 MILLIGRAM(S): 20 TABLET, DELAYED RELEASE ORAL at 05:46

## 2023-05-10 RX ADMIN — Medication 200 MILLIGRAM(S): at 05:20

## 2023-05-10 RX ADMIN — GABAPENTIN 300 MILLIGRAM(S): 400 CAPSULE ORAL at 14:01

## 2023-05-10 RX ADMIN — METFORMIN HYDROCHLORIDE 1000 MILLIGRAM(S): 850 TABLET ORAL at 10:17

## 2023-05-10 NOTE — PHYSICAL THERAPY INITIAL EVALUATION ADULT - LONG TERM MEMORY, REHAB EVAL
2399-0605 [employer]. [address] [place] [address], [place] [address]. All rights reserved. This information is not intended as a substitute for professional medical care. Always follow your healthcare  professional's instructions.        GERD (Adult)    The esophagus is a tube that carries food from the mouth to the stomach. A valve (the LES, lower esophageal sphincter) at the lower end of the esophagus prevents stomach acid from flowing upward. When this valve doesn't work properly, stomach contents may repeatedly flow back up (reflux) into the esophagus. This is called gastroesophageal reflux disease (GERD). GERD can irritate the esophagus. It can cause problems with pain, swallowing or breathing. In severe cases, GERD can cause recurrent pneumonia (from aspiration or breathing in particles) or other serious problems.  Symptoms of reflux include burning, pressure or sharp pain in the upper abdomen or mid to lower chest. The pain can spread to the neck, back, or shoulder. There may be belching, an acid taste in the back of the throat, chronic cough, or sore throat, or hoarseness. GERD symptoms often occur during the day after a big meal. They can also occur at night when lying down.   Home care  Lifestyle changes can help reduce symptoms. If needed, your healthcare provider may prescribe medicines. Symptoms often improve with treatment, but if treatment is stopped, the symptoms often return after a few months. So most persons with GERD will need to continue treatment or get treatment on and off.  Lifestyle changes  · Limit or avoid fatty, fried, and spicy foods, as well as coffee, chocolate, mint, and foods with high acid content such as tomatoes and citrus fruit and juices (orange, grapefruit, lemon).  · Don’t eat large meals, especially at night. Frequent, smaller meals are best. Don't lie down right after eating. And don’t eat anything 3 hours before going to bed.  · Don't drink alcohol or smoke. As much as possible, stay away from second hand smoke.  · If you are overweight, losing weight will reduce symptoms.   · Don't wear tight clothing around your stomach area.  · If your symptoms occur during sleep, use  4992-2067 [employer]. [address] [place] [address], [place] [address]. All rights reserved. This information is not intended as a substitute for professional medical care. Always follow your healthcare professional's  instructions.        Esophagitis     With esophagitis, the lining of the esophagus is inflamed.   Do you often have burning pain in your chest? You may have esophagitis. This is when the lining of the esophagus becomes red and swollen (inflamed). The esophagus is the tube that connects your throat to your stomach. This sheet tells you more about esophagitis. It also explains your treatment options.  Main types of esophagitis  Reflux esophagitis. This is the more common type. It is caused by GERD (gastroesophageal reflux disease). Stomach contents with stomach acid flow back up into the esophagus. This happens over and over. It leads to inflammation. Risk factors can include:  · Being overweight  · Asthma  · Smoking  · Pregnancy  · Frequent vomiting  · Certain medicines (such as aspirin and other anti-inflammatories)  · Hiatal hernia  Infectious esophagitis. This is caused by an infection. You are more at risk for this if you have a weakened immune system and poor nutrition. Antibiotic use can also be a factor. The infection is often due to the following:  · A type of fungus (typically candida)  · A virus, such as herpes simplex virus 1 (HSV-1) or cytomegalovirus (CMV)  Eosinophilic esophagitis. Foods or other things around you can give you an allergic reaction. This triggers an immune response and leads to esophagitis.  Pill-induced esophagitis. Certain types of medicines can cause inflammation and ulcers in the esophagus. These include doxycycline, aspirin, NSAIDs, alendronate, potassium, quinidine, iron.  Symptoms of esophagitis  The following symptoms can occur with esophagitis:  · Pain when swallowing, or trouble swallowing  · Pain behind your breastbone (heartburn)  · Acid regurgitation  · Chronic sore throat  · Gum Inflammation  · Cavities  · Bad breath  · Nausea  · Pain in your upper belly (abdomen)  · Bleeding (indicated by bright red vomit or black, tarry stool)  These symptoms occur more often with reflux  esophagitis:  · Coughing, wheezing, or asthma  · Hoarseness  Diagnosis of esophagitis  Your healthcare provider will ask about your health history and symptoms. You’ll also be examined. Sometimes certain tests are needed. These may include:  · Upper endoscopy. A thin, flexible tube with a tiny light and camera is used. It is inserted through the mouth down into the esophagus. This lets the provider look for damage. A small sample of tissue (biopsy) may also be removed. The sample is sent to a lab for testing.  · Upper GI X-ray with barium. An X-ray is done after you drink a substance called barium. Barium may make problems in the esophagus easier to see on an x-ray.  · Esophageal pH. A soft, thin tube is passed into the esophagus through the nose or mouth for 24 hours. It measures the acid level in the esophagus.  · Esophageal manometry. A soft, thin tube is passed into the esophagus through the nose or mouth. It measures muscle contractions in the esophagus.  Treatment of esophagitis  Medicines. Different medicines can help treat esophagitis. The medicine used will depend on the type of esophagitis you have. Talk with your healthcare provider.  Lifestyle changes. Making the following changes can help reduce irritation and ease your symptoms:  · Avoid spicy foods (pepper, chili powder, salgado). Also avoid hard foods (nuts, crackers, raw vegetables) and acidic foods and drinks (tomatoes, citrus fruits and juices). Other problem foods include chocolate, peppermint, nutmeg, and foods high in fat.  · Until you can swallow without pain, follow a combined liquid and soft diet. Try foods such as cooked cereals, mashed potatoes, and soups.  · Take small bites and chew your food thoroughly.  · Avoid large meals and heavy evening meals. Don't lie down within 2 to 3 hours of eating.  · Get to or stay at a healthy weight.  · Avoid alcohol, caffeine, and smoking or tobacco products.  · Brush and floss your teeth  · Raise your  upper body by 4 to 6 inches when lying in bed. This can be done using a foam wedge. Or put blocks under the legs at the head of your bed.  Surgery. This may be needed for severe reflux esophagitis. Other noninvasive procedures to treat GERD and esophagitis are being studied. Your provider can tell you more.  Why treatment Is important  Without treatment, esophagitis can get worse. This is especially true with severe reflux esophagitis. For instance, continued symptoms can cause scarring of the esophagus. Over time, this can cause a narrowing the esophagus (stricture). This can make it hard to pass food down to the stomach. As symptoms go on they can also cause changes in the lining of the esophagus. These changes can put you at a slightly higher risk of cancer of the esophagus.   Date Last Reviewed: 7/1/2016  © 4355-1344 [employer] [address] [place] [address], [place] [address]. All rights reserved. This information is not intended as a substitute for professional medical care. Always follow your healthcare professional's instructions.         intact

## 2023-05-10 NOTE — CHART NOTE - NSCHARTNOTEFT_GEN_A_CORE
Troponin levels negative x 2. EKG wnl. Pt cleared for discharge per Danyelle Saldana NP. Medrol pack was sent. Pt instructed to monitor glucose while on meds. Pt understood. Troponin levels negative x 2. EKG wnl. Pt cleared for discharge per Danyelle Saldana NP. Medrol pack was sent. Pt instructed to monitor glucose while on meds. Pt understood. Discussed case with Dr. Garza.

## 2023-05-10 NOTE — PHYSICAL THERAPY INITIAL EVALUATION ADULT - PHYSICAL ASSIST/NONPHYSICAL ASSIST: GAIT, REHAB EVAL
POD 1 from a posterior fusion. A&O. CMS intact except baseline N/T fingers/toes. Bowel sounds active, negative flatus, tolerating clear liquids diet. VSS. Dressing C/D/I. TITI left 50mL, right 40mL. Bedrest, mild frontal HA 1/10. C/o back pain, decreased with 10mg oxycodone. Pt scoring green on the Aggression Stop Light Tool. Plan continue HOB trial, recheck HGB am.   supervision

## 2023-05-10 NOTE — DISCHARGE NOTE NURSING/CASE MANAGEMENT/SOCIAL WORK - NSDCPEFALRISK_GEN_ALL_CORE
For information on Fall & Injury Prevention, visit: [email or web address] OR  [email or web address] OR  https://www.cdc.gov/steadi/patient.html

## 2023-05-10 NOTE — PHYSICAL THERAPY INITIAL EVALUATION ADULT - GENERAL OBSERVATIONS, REHAB EVAL
Pt rec'd sitting up in bedside chair, cervical collar donned, pt with no c/o pain, cooperative with PT.

## 2023-05-10 NOTE — DISCHARGE NOTE NURSING/CASE MANAGEMENT/SOCIAL WORK - PATIENT PORTAL LINK FT
You can access the FollowMyHealth Patient Portal offered by [facility] by registering at the following website: [email or web address]. By joining [facility]’s FollowMyHealth portal, you will also be able to view your health information using other applications (apps) compatible with our system.

## 2023-05-10 NOTE — DISCHARGE NOTE PROVIDER - CARE PROVIDER_API CALL
Keena Garza)  Orthopaedic Surgery  [address] [place] [address], [address] [place] [address]  Phone: [phone]  Fax: [phone]  Follow Up Time: 1 week

## 2023-05-10 NOTE — PROGRESS NOTE ADULT - SUBJECTIVE AND OBJECTIVE BOX
POD#1. Pt seen resting in bed comfortably. Pt has incisional site soreness tolerable with current meds. Pt has chronic numbness of the left hand present pre-op. Pt voided on own without complications. Pt passed flatus. Mobilzied with physical therapy and on his own without complications. Pt has chronic difficulty with swallowing present pre-op. He is able to swallow liquids with some discomfort. Pt had 1 episode of chest discomfort earlier this morning lasting 10 seconds while he was looking down on his phone. Pt denies SOB, vertigo, and lightheadedness.    PE  Gen appearance: NAD  motor strength: 5/5 of b/l deltoids, b/l biceps,b/l triceps, b/l wrist ext and flex, b/l  strength.  Sensation: intact to light touch bilat upper ext  No calf tenderness bilat  Incisional site: clean and dry. Drain: 40cc over 24 hours last shift 30cc serosang drainage.     Plan  Afeb, VSS. HR:62, BP:116/61  Drain removed without complication.  Medicine consulted. EKG done earlier this morning read as normal.  DC home pending medical clearance.  Discussed case with Dr. Garza.

## 2023-05-10 NOTE — DISCHARGE NOTE PROVIDER - HOSPITAL COURSE
05/09/23: ACDF C4-7    05/10/23: POD#1  POD#1. Pt seen resting in bed comfortably. Pt has incisional site soreness tolerable with current meds. Pt has chronic numbness of the left hand present pre-op. Pt voided on own without complications. Pt passed flatus. Mobilized with physical therapy and on his own without complications. Pt has chronic difficulty with swallowing present pre-op. He is able to swallow liquids with some discomfort. Pt had 1 episode of chest discomfort earlier this morning lasting 10 seconds while he was looking down on his phone. Pt denies SOB, vertigo, and lightheadedness.    PE  Gen appearance: NAD  motor strength: 5/5 of b/l deltoids, b/l biceps,b/l triceps, b/l wrist ext and flex, b/l  strength.  Sensation: intact to light touch bilat upper ext  No calf tenderness bilat  Incisional site: clean and dry. Drain: 40cc over 24 hours last shift 30cc serosang drainage.    Plan  Afeb, VSS. HR:62, BP:116/61  Drain removed without complication.  Medicine consulted. EKG done earlier this morning read as normal.  DC home pending medical clearance and troponin level

## 2023-05-10 NOTE — DISCHARGE NOTE NURSING/CASE MANAGEMENT/SOCIAL WORK - HAVE YOU RECEIVED AT LEAST TWO PFIZER AND/OR MODERNA VACCINATIONS (IN ANY COMBINATION) AND/OR ONE JOHNSON & JOHNSON VACCINATION?
"Subjective   Oneida See is a 66 y.o. female.     History of Present Illness   Patient had developed right upper abdominal ache, that had moved to the back, and then to the left upper back. She had nl RUQ US and CXR. It hurts to take a deep breath, and she had to moved to her sons house, as she needed a help due to the pain.Overall, she is much better. Dhe is concened that she had developed abdominal distention, especially as she had eaten a lot of cheese after she had broke the fast. States that it felt like a \"bruise\". Had noticed that her urine was dark for few days.  Also had hard pellet-like stools.  The following portions of the patient's history were reviewed and updated as appropriate: allergies, current medications, past family history, past medical history, past social history, past surgical history and problem list.    Review of Systems   Constitutional: Positive for appetite change. Negative for chills and fever.   Eyes: Negative for pain and redness.   Respiratory: Negative for cough and shortness of breath.    Cardiovascular: Negative for chest pain and leg swelling.   Gastrointestinal: Positive for abdominal distention and abdominal pain.   Neurological: Negative for dizziness and headaches.       Objective   Physical Exam   Constitutional: She is oriented to person, place, and time. She appears well-developed and well-nourished.   HENT:   Head: Normocephalic and atraumatic.   Right Ear: Tympanic membrane, external ear and ear canal normal.   Left Ear: Tympanic membrane, external ear and ear canal normal.   Nose: Nose normal. Right sinus exhibits no maxillary sinus tenderness and no frontal sinus tenderness. Left sinus exhibits no maxillary sinus tenderness and no frontal sinus tenderness.   Mouth/Throat: Uvula is midline, oropharynx is clear and moist and mucous membranes are normal.   Eyes: Conjunctivae and EOM are normal. Pupils are equal, round, and reactive to light. Right eye exhibits no " discharge. Left eye exhibits no discharge. No scleral icterus.   Neck: Neck supple. No JVD present.   Cardiovascular: Normal rate, regular rhythm and normal heart sounds. Exam reveals no gallop and no friction rub.   No murmur heard.  Pulmonary/Chest: Effort normal and breath sounds normal. She has no wheezes. She has no rales.   Musculoskeletal: She exhibits tenderness (minimal tenderness on palpation over the posterion 10th rib). She exhibits no edema.   Lymphadenopathy:     She has no cervical adenopathy.   Neurological: She is alert and oriented to person, place, and time. No cranial nerve deficit.   Skin: Skin is warm and dry. No rash noted.   Psychiatric: She has a normal mood and affect. Her behavior is normal.   Vitals reviewed.      Assessment/Plan   Oneida was seen today for abdominal pain.    Diagnoses and all orders for this visit:    Right lower quadrant abdominal pain  -     Urinalysis With Microscopic If Indicated (No Culture) - Urine, Clean Catch; Future  -     Urinalysis With Microscopic If Indicated (No Culture) - Urine, Clean Catch    Flank pain  -     Urinalysis With Microscopic If Indicated (No Culture) - Urine, Clean Catch; Future  -     Urinalysis With Microscopic If Indicated (No Culture) - Urine, Clean Catch    Slow transit constipation    Chest wall pain    Right upper quadrant abdominal pain      1. RUQ pain - most likely due to constipation and dehydration. Needs to increase fluids intake. Take Colace 100 mg twice a day.  2. Chest wall pain - most likely musculoskeletal due to over-extention. Doing better, continue Advil.  3. Constipation - increase fluid intake and start Colace 100 mg twice a day.            Yes

## 2023-05-10 NOTE — CONSULT NOTE ADULT - SUBJECTIVE AND OBJECTIVE BOX
PCP:  Dr. Geovanny Fletcher    CHIEF COMPLAINT: chest tightness    HISTORY OF THE PRESENT ILLNESS: this is a 67 yo male with pmh: obesity, YESSI, right breast Ca, HTN, hypothyroidism, GERD, benign essential tremors, Diabetes type 2, HLD, GERD who was admitted yesterday S/P anterior multi level cervical discectomy for cervical spinal stenosis.  This am on rounds he told the ortho PA that he had " chest tightness" earlier and medical team was consulted.  Pt seen and examined,  states he didn't get to sleep last night d/t issues that his was having and he was sitting on the side of his bed, playing a game on his phone when he experienced chest tightness, states it felt like "someone was sitting on his chest."  States it lasted approx a minute, denies any diaphoresis, no radiation to back, neck or LUE, states he has never had it before, no n/v. he was up with PT and denies any CP or sob when ambulating, no recurrence since this am, EKG was performed noted to be NSR without any ischemic changes.  States he had a recent nuclear stress/echo last month with Dr Fletcher for  this upcoming surgery and states it was negative, denies smoking or ETOH use.      PAST MEDICAL HISTORY: as above    PAST SURGICAL HISTORY: colonosocopy, carpal tunnel left wrist, upper endo, excision of pilonidal cysts, right mastectomy    FAMILY HISTORY:   none found on chart review    SOCIAL HISTORY:  no smoking, no alcohol, no drugs    ALLERGIES: demerol    HOME MEDS: see med rec    REVIEW OF SYSTEMS:   All 10 systems reviewed in detailed and found to be negative with the exception of what has already been described above    MEDICATIONS  (STANDING):  atorvastatin 40 milliGRAM(s) Oral at bedtime  cyclobenzaprine 10 milliGRAM(s) Oral every 8 hours  gabapentin 300 milliGRAM(s) Oral three times a day  lactated ringers. 1000 milliLiter(s) (75 mL/Hr) IV Continuous <Continuous>  levothyroxine 50 MICROGram(s) Oral daily  metFORMIN 1000 milliGRAM(s) Oral daily  pantoprazole    Tablet 40 milliGRAM(s) Oral before breakfast  propranolol 60 milliGRAM(s) Oral two times a day  senna 2 Tablet(s) Oral at bedtime  tranexamic acid Infusion 1 mG/kG/Hr (74 mL/Hr) IV Continuous <Continuous>  tranexamic acid IVPB 1000 milliGRAM(s) IV Intermittent once    MEDICATIONS  (PRN):  magnesium hydroxide Suspension 30 milliLiter(s) Oral every 12 hours PRN Constipation  oxyCODONE    IR 5 milliGRAM(s) Oral every 4 hours PRN Moderate Pain (4 - 6)  oxyCODONE    IR 10 milliGRAM(s) Oral every 4 hours PRN Severe Pain (7 - 10)      VITALS:  T(F): 98.1 (05-10-23 @ 12:00), Max: 98.4 (05-09-23 @ 20:00)  HR: 62 (05-10-23 @ 12:00) (58 - 84)  BP: 108/53 (05-10-23 @ 12:00) (108/53 - 146/94)  RR: 18 (05-10-23 @ 12:00) (14 - 18)  SpO2: 96% (05-10-23 @ 12:00) (66% - 100%)  Wt(kg): --    I&O's Summary    09 May 2023 07:01  -  10 May 2023 07:00  --------------------------------------------------------  IN: 2425 mL / OUT: 2995 mL / NET: -570 mL        CAPILLARY BLOOD GLUCOSE    POCT Blood Glucose.: 129 mg/dL (10 May 2023 09:47)  POCT Blood Glucose.: 196 mg/dL (09 May 2023 21:31)  POCT Blood Glucose.: 135 mg/dL (09 May 2023 15:30)  POCT Blood Glucose.: 137 mg/dL (09 May 2023 15:01)    PHYSICAL EXAM:    GENERAL: Comfortable, no acute distress   HEAD:  Normocephalic, atraumatic  EYES: EOMI, PERRLA  HEENT: Moist mucous membranes  NECK: Supple, No JVD  NERVOUS SYSTEM:  Alert & Oriented X3, Motor Strength 5/5 B/L upper and lower extremities  CHEST/LUNG: Clear to auscultation bilaterally  HEART: Regular rate and rhythm  ABDOMEN: Soft, non tender, Nondistended, Bowel sounds present  GENITOURINARY: Voiding, no palpable bladder  EXTREMITIES:   No clubbing, cyanosis, or edema  MUSCULOSKELETAL- cervical collar in place, anterior dsg noted with scant amt serosang drainage  SKIN-no rash      LABS: none drawn today      EKG:  reviewed by Me: NSR      IMPRESSION:  67 yo male with above pmh a/w;    # cervical spinal stenosis  # S/P anterior cervical discectomy and fusion, C 4-5, C5-6, C 6-7   wound care per spine surgery   c-collar on at all times   pain control   CCD diet  PT eval      # Chest tightness    EKG: NSR   recent nuclear stress test wnl   1st set of troponins neg    2nd set to be drawn at 1700    #  DM, tyoe 2  A1C 6.6  HOLD OHAS  bgm/ss  sugars acceptable    # Hypothyroidism  cont levothyroxine    # benign essential tremors/HTN  cont propanolol    # HLD  cont statin    #GERD  PPI    # VTE prophylaxis  hold chemical prophylaxis 2/2 recent spine surgery  VTE  mobilize    Dispo: if trops neg pt cleared for discharge from a medical perspective  pt aware he need to sets of trops before going home    Thank you for the consult, will follow

## 2023-05-10 NOTE — DISCHARGE NOTE PROVIDER - NSDCMRMEDTOKEN_GEN_ALL_CORE_FT
atorvastatin 40 mg oral tablet: 1 orally once a day (at bedtime)  cyclobenzaprine 10 mg oral tablet: 1 tab(s) orally every 8 hours  gabapentin 300 mg oral capsule: 1 cap(s) orally 3 times a day  Jardiance 10 mg oral tablet: 1 orally once a day  lansoprazole 30 mg oral delayed release capsule: 1 orally once a day  levothyroxine 50 mcg (0.05 mg) oral tablet: 1 orally once a day  metFORMIN 1000 mg oral tablet: 1 orally once a day (in the morning)  metFORMIN 500 mg oral tablet: 1 orally once a day (at bedtime)  propranolol 120 mg oral capsule, extended release: 1 orally once a day (at bedtime)  Vitamin D3 twice daily:

## 2023-05-11 LAB — SURGICAL PATHOLOGY STUDY: SIGNIFICANT CHANGE UP

## 2023-05-16 DIAGNOSIS — Z79.84 LONG TERM (CURRENT) USE OF ORAL HYPOGLYCEMIC DRUGS: ICD-10-CM

## 2023-05-16 DIAGNOSIS — M54.12 RADICULOPATHY, CERVICAL REGION: ICD-10-CM

## 2023-05-16 DIAGNOSIS — Z85.3 PERSONAL HISTORY OF MALIGNANT NEOPLASM OF BREAST: ICD-10-CM

## 2023-05-16 DIAGNOSIS — K21.9 GASTRO-ESOPHAGEAL REFLUX DISEASE WITHOUT ESOPHAGITIS: ICD-10-CM

## 2023-05-16 DIAGNOSIS — M48.02 SPINAL STENOSIS, CERVICAL REGION: ICD-10-CM

## 2023-05-16 DIAGNOSIS — E66.01 MORBID (SEVERE) OBESITY DUE TO EXCESS CALORIES: ICD-10-CM

## 2023-05-16 DIAGNOSIS — E11.9 TYPE 2 DIABETES MELLITUS WITHOUT COMPLICATIONS: ICD-10-CM

## 2023-05-16 DIAGNOSIS — G47.33 OBSTRUCTIVE SLEEP APNEA (ADULT) (PEDIATRIC): ICD-10-CM

## 2023-05-16 DIAGNOSIS — Z90.11 ACQUIRED ABSENCE OF RIGHT BREAST AND NIPPLE: ICD-10-CM

## 2023-05-16 DIAGNOSIS — E78.5 HYPERLIPIDEMIA, UNSPECIFIED: ICD-10-CM

## 2023-05-16 DIAGNOSIS — I10 ESSENTIAL (PRIMARY) HYPERTENSION: ICD-10-CM

## 2023-09-13 NOTE — ED PROVIDER NOTE - NS ED MD DISPO DISCHARGE CCDA
Detail Level: Generalized Detail Level: Zone Detail Level: Detailed Patient/Caregiver provided printed discharge information.

## 2024-02-22 NOTE — DISCHARGE NOTE PROVIDER - NSDCACTIVITY_GEN_ALL_CORE
EEG Report  [facility]  [address], [place] [id number] [phone]      DATE OF PROCEDURE: 2/19/2024    EEG # : SMP [id number]    PATIENT:   Bia Guzman    DICTATING PHYSICIAN:  Ping Arguelles M.D    MR#: [id number]    BILLING NUMBER: [id number]    TECHNIQUE:  20 channels of EEG and 1 channel of EKG were recorded utilizing the International 10/20 System. Recording time was 50 minutes    YOB: 2015    REFERRING PHYSICIAN: Silvia Chilel MD    MEDICATIONS:    Current Outpatient Medications:     Pediatric Multiple Vitamins (MULTIVITAMIN CHILDRENS PO), Take by mouth, Disp: , Rfl:     Magnesium Oxide -Mg Supplement 200 MG TABS, Take 200 mg by mouth every evening, Disp: 30 tablet, Rfl: 3    fluticasone (FLONASE) 50 MCG/ACT nasal spray, USE 1 SPRAY(S) IN EACH NOSTRIL ONCE DAILY, Disp: , Rfl:     EEG FINDINGS:  The patient was awake, drowsy during the recording.  The background activity during awake state consisted of well-regulated 8-9 Hz rhythmic waveforms, symmetrically distributed over both posterior quadrants and reactive to eye opening.  There was no focal slowing, spike or sharp waves identifiable in the recording.  No electrographic or clinical seizures were recorded during the study.      ACTIVATION: Hyperventilation: High amplitude slow waves seen      Photic stimulation: Symmetric photic drive was seen.      Sleep:   Stage 1, 2 sleep stage seen.       IMPRESSION:  This is a normal extended EEG.  No clinical or electrographic seizures were recorded during the study.  No epileptiform features were noted.      Digital spike and seizure detection analysis has been performed on this study.        Ping Arguelles M.D  Diplomate, American Board Of Clinical Neurophysiology with special competency in Epilepsy monitoring    
Do not drive or operate machinery/No heavy lifting/straining

## 2024-03-26 ENCOUNTER — INPATIENT (INPATIENT)
Facility: HOSPITAL | Age: 69
LOS: 0 days | Discharge: HOME | DRG: 641 | End: 2024-03-27
Attending: HOSPITALIST | Admitting: INTERNAL MEDICINE
Payer: COMMERCIAL

## 2024-03-26 VITALS
HEART RATE: 73 BPM | TEMPERATURE: 98 F | RESPIRATION RATE: 18 BRPM | DIASTOLIC BLOOD PRESSURE: 72 MMHG | SYSTOLIC BLOOD PRESSURE: 132 MMHG | OXYGEN SATURATION: 100 %

## 2024-03-26 DIAGNOSIS — Z98.890 OTHER SPECIFIED POSTPROCEDURAL STATES: Chronic | ICD-10-CM

## 2024-03-26 DIAGNOSIS — Z90.11 ACQUIRED ABSENCE OF RIGHT BREAST AND NIPPLE: Chronic | ICD-10-CM

## 2024-03-26 LAB
ALBUMIN SERPL ELPH-MCNC: 4.1 G/DL — SIGNIFICANT CHANGE UP (ref 3.3–5)
ALP SERPL-CCNC: 115 U/L — SIGNIFICANT CHANGE UP (ref 40–120)
ALT FLD-CCNC: 31 U/L — SIGNIFICANT CHANGE UP (ref 12–78)
ANION GAP SERPL CALC-SCNC: 9 MMOL/L — SIGNIFICANT CHANGE UP (ref 5–17)
ANISOCYTOSIS BLD QL: SLIGHT — SIGNIFICANT CHANGE UP
APTT BLD: 35.3 SEC — SIGNIFICANT CHANGE UP (ref 24.5–35.6)
AST SERPL-CCNC: 30 U/L — SIGNIFICANT CHANGE UP (ref 15–37)
BASOPHILS # BLD AUTO: 0.05 K/UL — SIGNIFICANT CHANGE UP (ref 0–0.2)
BASOPHILS NFR BLD AUTO: 0.8 % — SIGNIFICANT CHANGE UP (ref 0–2)
BILIRUB SERPL-MCNC: 0.7 MG/DL — SIGNIFICANT CHANGE UP (ref 0.2–1.2)
BUN SERPL-MCNC: 16 MG/DL — SIGNIFICANT CHANGE UP (ref 7–23)
CALCIUM SERPL-MCNC: 9 MG/DL — SIGNIFICANT CHANGE UP (ref 8.5–10.1)
CHLORIDE SERPL-SCNC: 108 MMOL/L — SIGNIFICANT CHANGE UP (ref 96–108)
CK SERPL-CCNC: 105 U/L — SIGNIFICANT CHANGE UP (ref 26–308)
CO2 SERPL-SCNC: 22 MMOL/L — SIGNIFICANT CHANGE UP (ref 22–31)
CREAT SERPL-MCNC: 1.27 MG/DL — SIGNIFICANT CHANGE UP (ref 0.5–1.3)
EGFR: 62 ML/MIN/1.73M2 — SIGNIFICANT CHANGE UP
ELLIPTOCYTES BLD QL SMEAR: SLIGHT — SIGNIFICANT CHANGE UP
EOSINOPHIL # BLD AUTO: 0.13 K/UL — SIGNIFICANT CHANGE UP (ref 0–0.5)
EOSINOPHIL NFR BLD AUTO: 2 % — SIGNIFICANT CHANGE UP (ref 0–6)
GLUCOSE SERPL-MCNC: 99 MG/DL — SIGNIFICANT CHANGE UP (ref 70–99)
HCT VFR BLD CALC: 49.6 % — SIGNIFICANT CHANGE UP (ref 39–50)
HGB BLD-MCNC: 15.1 G/DL — SIGNIFICANT CHANGE UP (ref 13–17)
IMM GRANULOCYTES NFR BLD AUTO: 0.3 % — SIGNIFICANT CHANGE UP (ref 0–0.9)
INR BLD: 1.11 RATIO — SIGNIFICANT CHANGE UP (ref 0.85–1.18)
LYMPHOCYTES # BLD AUTO: 0.9 K/UL — LOW (ref 1–3.3)
LYMPHOCYTES # BLD AUTO: 14 % — SIGNIFICANT CHANGE UP (ref 13–44)
MANUAL SMEAR VERIFICATION: SIGNIFICANT CHANGE UP
MCHC RBC-ENTMCNC: 24.2 PG — LOW (ref 27–34)
MCHC RBC-ENTMCNC: 30.4 GM/DL — LOW (ref 32–36)
MCV RBC AUTO: 79.6 FL — LOW (ref 80–100)
MONOCYTES # BLD AUTO: 1.17 K/UL — HIGH (ref 0–0.9)
MONOCYTES NFR BLD AUTO: 18.2 % — HIGH (ref 2–14)
NEUTROPHILS # BLD AUTO: 4.16 K/UL — SIGNIFICANT CHANGE UP (ref 1.8–7.4)
NEUTROPHILS NFR BLD AUTO: 64.7 % — SIGNIFICANT CHANGE UP (ref 43–77)
NT-PROBNP SERPL-SCNC: 165 PG/ML — HIGH (ref 0–125)
PLAT MORPH BLD: NORMAL — SIGNIFICANT CHANGE UP
PLATELET # BLD AUTO: 87 K/UL — LOW (ref 150–400)
POIKILOCYTOSIS BLD QL AUTO: SLIGHT — SIGNIFICANT CHANGE UP
POTASSIUM SERPL-MCNC: 3.8 MMOL/L — SIGNIFICANT CHANGE UP (ref 3.5–5.3)
POTASSIUM SERPL-SCNC: 3.8 MMOL/L — SIGNIFICANT CHANGE UP (ref 3.5–5.3)
PROT SERPL-MCNC: 8.6 GM/DL — HIGH (ref 6–8.3)
PROTHROM AB SERPL-ACNC: 12.5 SEC — SIGNIFICANT CHANGE UP (ref 9.5–13)
RBC # BLD: 6.23 M/UL — HIGH (ref 4.2–5.8)
RBC # FLD: 16.8 % — HIGH (ref 10.3–14.5)
RBC BLD AUTO: ABNORMAL
SODIUM SERPL-SCNC: 139 MMOL/L — SIGNIFICANT CHANGE UP (ref 135–145)
TROPONIN I, HIGH SENSITIVITY RESULT: 6.55 NG/L — SIGNIFICANT CHANGE UP
WBC # BLD: 6.43 K/UL — SIGNIFICANT CHANGE UP (ref 3.8–10.5)
WBC # FLD AUTO: 6.43 K/UL — SIGNIFICANT CHANGE UP (ref 3.8–10.5)

## 2024-03-26 PROCEDURE — 85027 COMPLETE CBC AUTOMATED: CPT

## 2024-03-26 PROCEDURE — 93971 EXTREMITY STUDY: CPT | Mod: 26,LT

## 2024-03-26 PROCEDURE — 80048 BASIC METABOLIC PNL TOTAL CA: CPT

## 2024-03-26 PROCEDURE — 70450 CT HEAD/BRAIN W/O DYE: CPT | Mod: 26,MC

## 2024-03-26 PROCEDURE — 81003 URINALYSIS AUTO W/O SCOPE: CPT

## 2024-03-26 PROCEDURE — 83036 HEMOGLOBIN GLYCOSYLATED A1C: CPT

## 2024-03-26 PROCEDURE — 99285 EMERGENCY DEPT VISIT HI MDM: CPT

## 2024-03-26 PROCEDURE — 86803 HEPATITIS C AB TEST: CPT

## 2024-03-26 PROCEDURE — 84443 ASSAY THYROID STIM HORMONE: CPT

## 2024-03-26 PROCEDURE — 71045 X-RAY EXAM CHEST 1 VIEW: CPT | Mod: 26

## 2024-03-26 PROCEDURE — 84484 ASSAY OF TROPONIN QUANT: CPT

## 2024-03-26 PROCEDURE — 93010 ELECTROCARDIOGRAM REPORT: CPT

## 2024-03-26 PROCEDURE — 36415 COLL VENOUS BLD VENIPUNCTURE: CPT

## 2024-03-26 RX ORDER — EMPAGLIFLOZIN 10 MG/1
1 TABLET, FILM COATED ORAL
Refills: 0 | DISCHARGE

## 2024-03-26 RX ORDER — SODIUM CHLORIDE 9 MG/ML
3 INJECTION INTRAMUSCULAR; INTRAVENOUS; SUBCUTANEOUS ONCE
Refills: 0 | Status: COMPLETED | OUTPATIENT
Start: 2024-03-26 | End: 2024-03-26

## 2024-03-26 RX ORDER — ATORVASTATIN CALCIUM 80 MG/1
1 TABLET, FILM COATED ORAL
Refills: 0 | DISCHARGE

## 2024-03-26 RX ORDER — LEVOTHYROXINE SODIUM 125 MCG
1 TABLET ORAL
Refills: 0 | DISCHARGE

## 2024-03-26 RX ORDER — LANSOPRAZOLE 15 MG/1
1 CAPSULE, DELAYED RELEASE ORAL
Refills: 0 | DISCHARGE

## 2024-03-26 RX ORDER — METFORMIN HYDROCHLORIDE 850 MG/1
1 TABLET ORAL
Refills: 0 | DISCHARGE

## 2024-03-26 RX ORDER — PROPRANOLOL HCL 160 MG
1 CAPSULE, EXTENDED RELEASE 24HR ORAL
Refills: 0 | DISCHARGE

## 2024-03-26 RX ADMIN — SODIUM CHLORIDE 3 MILLILITER(S): 9 INJECTION INTRAMUSCULAR; INTRAVENOUS; SUBCUTANEOUS at 21:15

## 2024-03-26 NOTE — ED PROVIDER NOTE - OBJECTIVE STATEMENT
67 y/o male with PMHx of obesity, YESSI, right breast ca, GERD, cervical spinal stenosis treated by discectomy 5/23, HTN, hypothyroid, HLD, DM2 presents to the ED c/o generalized weakness since 6PM. Had nuclear stress test this morning at cardiologist Dr. Fletcher's office with no findings. States his bp was low to 66 systolic in the office, elevated to 109 systolic and was dc home. [place] fine at home, then started having lightheadedness, b/l blurry vision, near syncope, palpitations, mild SOB since 4PM. Denies N/V, diaphoresis, weakness/numbness in arms or legs, fever, LOC. Last ate at noon. Allergy to demerol. 67 y/o male with PMHx of obesity, YESSI, right breast CA, GERD, cervical spinal stenosis treated by discectomy 5/23, HTN, hypothyroid, HLD, DM2; BIBA from home to ED c/o generalized weakness, SOB, near-syncope. Had outpt nuclear stress test this morning at cardiologist Dr. Fletcher's office with reported no abnl. findings. States after test, his BP was low to 66 systolic, received IVF & eventually BP improved to 109 systolic and pt DC home. [place] initially fine at home, then started having acute onset of lightheadedness, b/l blurry vision, near-syncope, palpitations, mild SOB since 4PM. Denies N/V, diaphoresis, weakness/numbness in arms or legs, fever, LOC, cp. Last ate at noon.  Pt earlier this yoav in ED c/o'ed B/L LE cramping/spasm discomfort, self-resolved prior to ED MD dominguez & currently not present.   Allergy to demerol.

## 2024-03-26 NOTE — ED ADULT NURSE NOTE - OBJECTIVE STATEMENT
Pt states his fingers in his left hand feel tingling. Patient states having a headache and dizziness at home. Patient states he had a stress test earlier today and became hypotensive after the procedure. Patient had a witnessed cramping pain in his left leg during assessment. Left lower leg doesn't appear more red or swollen compared to the right leg. Patient has a past medication history of diabetes. 20 G IV inserted into left A/C. Cardiac monitor and BP monitor in place.

## 2024-03-26 NOTE — ED PROVIDER NOTE - NSICDXPASTMEDICALHX_GEN_ALL_CORE_FT
PAST MEDICAL HISTORY:  Benign essential tremor     Breast cancer, right     Cervical herniated disc     Cervical radiculopathy     Cervical spinal stenosis     Chronic coughing     GERD (gastroesophageal reflux disease)     Hearing loss     Hyperlipidemia     Hypertension     Numbness of left hand     Obesity, morbid, BMI 40.0-49.9     Sleep apnea     Type II diabetes mellitus

## 2024-03-26 NOTE — ED ADULT NURSE NOTE - NSFALLRISKINTERV_ED_ALL_ED

## 2024-03-26 NOTE — ED ADULT TRIAGE NOTE - CHIEF COMPLAINT QUOTE
Pt BIBEMS complaining of weakness x 20 minutes PTA. Pt had nuclear stress test this mornining and states "my blood pressure just wouldn't come up". Pt is a & O x4. BEFAST negative. Pt taken for EKG and monitor.

## 2024-03-26 NOTE — PHARMACOTHERAPY INTERVENTION NOTE - COMMENTS
Medication reconciliation completed.  Reviewed Medication list and confirmed med allergies with patient; confirmed with Dr. First [place].

## 2024-03-26 NOTE — ED ADULT NURSE REASSESSMENT NOTE - NS ED NURSE REASSESS COMMENT FT1
received handoff report from Flako OLVERA. safety precautions and comfort measures maintained. patient resting comfortably in bed. pending US and results.

## 2024-03-26 NOTE — ED PROVIDER NOTE - CLINICAL SUMMARY MEDICAL DECISION MAKING FREE TEXT BOX
69 y/o male multiple PMH including obesity, HLD, HTN, DM, YESSI BIBA from home c/o acute onset lightheaded, SOB, general weakness, near syncope. OP nuclear stress test at cardio office complicated by subsequent hypotension, improved by time of dc. Neuro intact, no active chest pain. Plan: EKG, CXR, CT head, b/l LE  venous doppler for leg pain BNP, CPK, serial troponin, monitor, observe, reassess.    2340: labs unremarkable, CT head and venous doppler reports negative. CXR wet read DEIRDRE. Patient in good comfort, VSS, case discussed and accepted by Dr. Li for medicine admit with remote tele. 69 y/o male multiple PMH including obesity, HLD, HTN, DM, YESSI BIBA from home c/o acute onset lightheaded, SOB, general weakness, near-syncope. + OP nuclear stress test at Cardio office this AM complicated by subsequent hypotension, improved s/p IVF at time of DC. Neuro intact, no active chest pain.   Plan: EKG, CXR, CT head, b/l LE  venous doppler for leg pain, labs incl. BNP, CPK, serial troponin, monitor, observe, reassess.    2340: Labs unremarkable, CT head and venous doppler reports negative. CXR wet read DEIRDRE. Patient in good comfort, VSS, case discussed and accepted by Dr. Li for Medicine admit with Remote Tele; will submit for formal AM Cardio consult/Dr. Fletcher.

## 2024-03-26 NOTE — ED PROVIDER NOTE - CHIEF COMPLAINT
Patient on metformin and glimeperide at home. HgA1C of 6.0.   -ISS+Lantus 17U, FSG not controlled will add NPH today and change Lantus to evening dosing with adjusted dose  -monitor FSG  -PATIENT REFUSING LANTUS AND F/S READINGS. The patient is a 68y Male complaining of weakness.

## 2024-03-26 NOTE — ED PROVIDER NOTE - NSICDXPASTSURGICALHX_GEN_ALL_CORE_FT
Yes... PAST SURGICAL HISTORY:  H/O colonoscopy     History of carpal tunnel surgery of left wrist     History of esophagogastroduodenoscopy (EGD)     History of excision of pilonidal cyst     History of right mastectomy

## 2024-03-27 ENCOUNTER — TRANSCRIPTION ENCOUNTER (OUTPATIENT)
Age: 69
End: 2024-03-27

## 2024-03-27 VITALS
OXYGEN SATURATION: 99 % | RESPIRATION RATE: 18 BRPM | SYSTOLIC BLOOD PRESSURE: 125 MMHG | DIASTOLIC BLOOD PRESSURE: 77 MMHG | HEART RATE: 81 BPM

## 2024-03-27 DIAGNOSIS — R55 SYNCOPE AND COLLAPSE: ICD-10-CM

## 2024-03-27 LAB
A1C WITH ESTIMATED AVERAGE GLUCOSE RESULT: 6.2 % — HIGH (ref 4–5.6)
ANION GAP SERPL CALC-SCNC: 7 MMOL/L — SIGNIFICANT CHANGE UP (ref 5–17)
APPEARANCE UR: CLEAR — SIGNIFICANT CHANGE UP
BILIRUB UR-MCNC: NEGATIVE — SIGNIFICANT CHANGE UP
BUN SERPL-MCNC: 17 MG/DL — SIGNIFICANT CHANGE UP (ref 7–23)
CALCIUM SERPL-MCNC: 8.5 MG/DL — SIGNIFICANT CHANGE UP (ref 8.5–10.1)
CHLORIDE SERPL-SCNC: 112 MMOL/L — HIGH (ref 96–108)
CO2 SERPL-SCNC: 20 MMOL/L — LOW (ref 22–31)
COLOR SPEC: SIGNIFICANT CHANGE UP
CREAT SERPL-MCNC: 1.14 MG/DL — SIGNIFICANT CHANGE UP (ref 0.5–1.3)
DIFF PNL FLD: NEGATIVE — SIGNIFICANT CHANGE UP
EGFR: 70 ML/MIN/1.73M2 — SIGNIFICANT CHANGE UP
ESTIMATED AVERAGE GLUCOSE: 131 MG/DL — HIGH (ref 68–114)
GLUCOSE SERPL-MCNC: 123 MG/DL — HIGH (ref 70–99)
GLUCOSE UR QL: >=1000 MG/DL
HCT VFR BLD CALC: 42.8 % — SIGNIFICANT CHANGE UP (ref 39–50)
HCV AB S/CO SERPL IA: 0.09 S/CO — SIGNIFICANT CHANGE UP (ref 0–0.99)
HCV AB SERPL-IMP: SIGNIFICANT CHANGE UP
HGB BLD-MCNC: 13.3 G/DL — SIGNIFICANT CHANGE UP (ref 13–17)
KETONES UR-MCNC: 15 MG/DL
LEUKOCYTE ESTERASE UR-ACNC: NEGATIVE — SIGNIFICANT CHANGE UP
MCHC RBC-ENTMCNC: 24.1 PG — LOW (ref 27–34)
MCHC RBC-ENTMCNC: 31.1 GM/DL — LOW (ref 32–36)
MCV RBC AUTO: 77.5 FL — LOW (ref 80–100)
NITRITE UR-MCNC: NEGATIVE — SIGNIFICANT CHANGE UP
PH UR: 5 — SIGNIFICANT CHANGE UP (ref 5–8)
PLATELET # BLD AUTO: 85 K/UL — LOW (ref 150–400)
POTASSIUM SERPL-MCNC: 4 MMOL/L — SIGNIFICANT CHANGE UP (ref 3.5–5.3)
POTASSIUM SERPL-SCNC: 4 MMOL/L — SIGNIFICANT CHANGE UP (ref 3.5–5.3)
PROT UR-MCNC: NEGATIVE MG/DL — SIGNIFICANT CHANGE UP
RBC # BLD: 5.52 M/UL — SIGNIFICANT CHANGE UP (ref 4.2–5.8)
RBC # FLD: 15.6 % — HIGH (ref 10.3–14.5)
SODIUM SERPL-SCNC: 139 MMOL/L — SIGNIFICANT CHANGE UP (ref 135–145)
SP GR SPEC: >1.03 — HIGH (ref 1–1.03)
TROPONIN I, HIGH SENSITIVITY RESULT: 6.86 NG/L — SIGNIFICANT CHANGE UP
TSH SERPL-MCNC: 3.12 UU/ML — SIGNIFICANT CHANGE UP (ref 0.34–4.82)
UROBILINOGEN FLD QL: 0.2 MG/DL — SIGNIFICANT CHANGE UP (ref 0.2–1)
WBC # BLD: 4.81 K/UL — SIGNIFICANT CHANGE UP (ref 3.8–10.5)
WBC # FLD AUTO: 4.81 K/UL — SIGNIFICANT CHANGE UP (ref 3.8–10.5)

## 2024-03-27 PROCEDURE — 99223 1ST HOSP IP/OBS HIGH 75: CPT

## 2024-03-27 RX ORDER — ATORVASTATIN CALCIUM 80 MG/1
40 TABLET, FILM COATED ORAL AT BEDTIME
Refills: 0 | Status: DISCONTINUED | OUTPATIENT
Start: 2024-03-27 | End: 2024-03-27

## 2024-03-27 RX ORDER — DEXTROSE 50 % IN WATER 50 %
25 SYRINGE (ML) INTRAVENOUS ONCE
Refills: 0 | Status: DISCONTINUED | OUTPATIENT
Start: 2024-03-27 | End: 2024-03-27

## 2024-03-27 RX ORDER — GLUCAGON INJECTION, SOLUTION 0.5 MG/.1ML
1 INJECTION, SOLUTION SUBCUTANEOUS ONCE
Refills: 0 | Status: DISCONTINUED | OUTPATIENT
Start: 2024-03-27 | End: 2024-03-27

## 2024-03-27 RX ORDER — PANTOPRAZOLE SODIUM 20 MG/1
40 TABLET, DELAYED RELEASE ORAL
Refills: 0 | Status: DISCONTINUED | OUTPATIENT
Start: 2024-03-27 | End: 2024-03-27

## 2024-03-27 RX ORDER — ACETAMINOPHEN 500 MG
650 TABLET ORAL EVERY 6 HOURS
Refills: 0 | Status: DISCONTINUED | OUTPATIENT
Start: 2024-03-27 | End: 2024-03-27

## 2024-03-27 RX ORDER — LANOLIN ALCOHOL/MO/W.PET/CERES
3 CREAM (GRAM) TOPICAL AT BEDTIME
Refills: 0 | Status: DISCONTINUED | OUTPATIENT
Start: 2024-03-27 | End: 2024-03-27

## 2024-03-27 RX ORDER — ONDANSETRON 8 MG/1
4 TABLET, FILM COATED ORAL EVERY 8 HOURS
Refills: 0 | Status: DISCONTINUED | OUTPATIENT
Start: 2024-03-27 | End: 2024-03-27

## 2024-03-27 RX ORDER — INSULIN LISPRO 100/ML
VIAL (ML) SUBCUTANEOUS
Refills: 0 | Status: DISCONTINUED | OUTPATIENT
Start: 2024-03-27 | End: 2024-03-27

## 2024-03-27 RX ORDER — PROPRANOLOL HCL 160 MG
120 CAPSULE, EXTENDED RELEASE 24HR ORAL DAILY
Refills: 0 | Status: DISCONTINUED | OUTPATIENT
Start: 2024-03-27 | End: 2024-03-27

## 2024-03-27 RX ORDER — ENOXAPARIN SODIUM 100 MG/ML
40 INJECTION SUBCUTANEOUS EVERY 24 HOURS
Refills: 0 | Status: DISCONTINUED | OUTPATIENT
Start: 2024-03-27 | End: 2024-03-27

## 2024-03-27 RX ORDER — DEXTROSE 50 % IN WATER 50 %
15 SYRINGE (ML) INTRAVENOUS ONCE
Refills: 0 | Status: DISCONTINUED | OUTPATIENT
Start: 2024-03-27 | End: 2024-03-27

## 2024-03-27 RX ORDER — METFORMIN HYDROCHLORIDE 850 MG/1
500 TABLET ORAL ONCE
Refills: 0 | Status: COMPLETED | OUTPATIENT
Start: 2024-03-27 | End: 2024-03-27

## 2024-03-27 RX ORDER — SODIUM CHLORIDE 9 MG/ML
1000 INJECTION, SOLUTION INTRAVENOUS
Refills: 0 | Status: DISCONTINUED | OUTPATIENT
Start: 2024-03-27 | End: 2024-03-27

## 2024-03-27 RX ORDER — LEVOTHYROXINE SODIUM 125 MCG
50 TABLET ORAL DAILY
Refills: 0 | Status: DISCONTINUED | OUTPATIENT
Start: 2024-03-27 | End: 2024-03-27

## 2024-03-27 RX ADMIN — METFORMIN HYDROCHLORIDE 500 MILLIGRAM(S): 850 TABLET ORAL at 01:07

## 2024-03-27 RX ADMIN — Medication 50 MICROGRAM(S): at 06:32

## 2024-03-27 NOTE — ED ADULT NURSE REASSESSMENT NOTE - NS ED NURSE REASSESS COMMENT FT1
Pt resting in stretcher comfortably with safety maintained. Pt denies pain/discomfort. Respirations even and unlabored. Pt reports not needing at thing at this time. Awaiting bed on unit. Pt updated on plan of care.

## 2024-03-27 NOTE — H&P ADULT - ASSESSMENT
69 y/o male with PMHx of obesity, YESSI, right breast ca, GERD, cervical spinal stenosis treated by discectomy 5/23, HTN, hypothyroid, HLD, DM2 presents to the ED c/o generalized weakness. Went for Stress test yesterday morning, did well but blood pressure dropped. He was given some fluid and blood pressure improved and was discharged home. He went to the park with his grandchildren and noted to be very weak, with some palpitations and visual changes. He denies LOC. He was brought to the ER by ambulance. Currently, feels well, blood pressure at baseline.     # Presyncope  - Admit to medicine  - Troponin negative  - Stress test yesterday with Dr. Fletcher  - Defer Echo, likely had one with Cards  - Blood pressure back to baseline  - EKG no acute changes  - Likely DC home today pending cards eval    # DM  - ISS    # HLD  - Continue atorvastatin    # Hypothyroid  - Continue levothyroxine  - Check TSH    # DVT prophylaxis  - Lovenox 69 y/o male with PMHx of obesity, YESSI, right breast ca, GERD, cervical spinal stenosis treated by discectomy 5/23, HTN, hypothyroid, HLD, DM2 presents to the ED c/o generalized weakness. Went for Stress test yesterday morning, did well but blood pressure dropped. He was given some fluid and blood pressure improved and was discharged home. He went to the park with his grandchildren and noted to be very weak, with some palpitations and visual changes. He denies LOC. He was brought to the ER by ambulance. Currently, feels well, blood pressure at baseline.     # Presyncope  - Admit to medicine  - Troponin negative  - Stress test yesterday with Dr. Fletcher  - Defer Echo, likely had one with Cards  - Blood pressure back to baseline  - EKG no acute changes  - Check orthostatics  - Likely DC home today pending cards eval    # DM  - ISS    # HLD  - Continue atorvastatin    # Hypothyroid  - Continue levothyroxine  - Check TSH    # DVT prophylaxis  - Lovenox

## 2024-03-27 NOTE — DISCHARGE NOTE PROVIDER - HOSPITAL COURSE
69 y/o male with PMHx of obesity, YESSI, right breast ca, GERD, cervical spinal stenosis treated by discectomy 5/23, HTN, hypothyroid, HLD, DM2 presents to the ED c/o generalized weakness. Went for Stress test yesterday morning, did well but blood pressure dropped. He was given some fluid and blood pressure improved and was discharged home. He went to the park with his grandchildren and noted to be very weak, with some palpitations and visual changes. He denies LOC. He was brought to the ER by ambulance. Currently, feels well, blood pressure at baseline.     # Presyncope DUE TO DEHYDRATION POST STRESS  - Troponin negative  - Stress test yesterday with Dr. Fletcher  - Blood pressure back to baseline  - EKG no acute changes  URINE BENIGN: NO FEVER, NEGATIVE FOR LEUKOCYTOSIS, CXR NEGATIVE    # DM  - ISS    #Thrombocytopenia: NOTED counts in May: stable    # HLD  - Continue atorvastatin    # Hypothyroid  - Continue levothyroxine    # DVT prophylaxis  - Lovenox

## 2024-03-27 NOTE — CONSULT NOTE ADULT - CONSULT REASON
67 yo WM, mult PMH incl. obesity, YESSI, HTN, HLD,m DM, + nuclear stress test outpt at cardio office complicated by reported hypotension, presents s/p near-syncope at home w/ SOB. Initial labs, CT head negative.

## 2024-03-27 NOTE — H&P ADULT - NSICDXPASTSURGICALHX_GEN_ALL_CORE_FT
PAST SURGICAL HISTORY:  H/O colonoscopy     History of carpal tunnel surgery of left wrist     History of esophagogastroduodenoscopy (EGD)     History of excision of pilonidal cyst     History of right mastectomy

## 2024-03-27 NOTE — DISCHARGE NOTE PROVIDER - NSDCMRMEDTOKEN_GEN_ALL_CORE_FT
atorvastatin 40 mg oral tablet: 1 tab(s) orally once a day (at bedtime)  Jardiance 10 mg oral tablet: 1 tab(s) orally once a day  lansoprazole 30 mg oral delayed release capsule: 1 cap(s) orally once a day (at bedtime)  levothyroxine 50 mcg (0.05 mg) oral tablet: 1 tablet orally once a day  metFORMIN 1000 mg oral tablet: 1 tablet orally once a day (in the morning)  metFORMIN 500 mg oral tablet: 1 tablet orally once a day (at bedtime)  Multiple Vitamins oral tablet: 1 tab(s) orally once a day  propranolol 120 mg oral capsule, extended release: 1 capsule, extended release orally once a day (at bedtime)

## 2024-03-27 NOTE — CONSULT NOTE ADULT - SUBJECTIVE AND OBJECTIVE BOX
CHIEF COMPLAINT: Patient is a 68y old  Male who presents with a chief complaint of Pre Syncope (27 Mar 2024 04:12)      HPI: HPI:  69 y/o male with PMHx of obesity, YESSI, right breast ca, GERD, cervical spinal stenosis treated by discectomy 5/23, HTN, hypothyroid, HLD, DM2 presents to the ED c/o generalized weakness. Went for Stress test yesterday morning, did well but blood pressure dropped. He was given some fluid and blood pressure improved and was discharged home. He went to the park with his grandchildren and noted to be very weak, with some palpitations and visual changes. He denies LOC. He was brought to the ER by ambulance. Currently, feels well, blood pressure at baseline.  (27 Mar 2024 04:12)      PMHx: PAST MEDICAL & SURGICAL HISTORY:  GERD (gastroesophageal reflux disease)      Hypertension      Hyperlipidemia      Sleep apnea      Type II diabetes mellitus      Cervical herniated disc      Cervical spinal stenosis      Cervical radiculopathy      Hearing loss      Breast cancer, right      Chronic coughing      Benign essential tremor      Obesity, morbid, BMI 40.0-49.9      Numbness of left hand      History of excision of pilonidal cyst      History of carpal tunnel surgery of left wrist      History of right mastectomy      H/O colonoscopy      History of esophagogastroduodenoscopy (EGD)            Soc Hx:     FAMILY HISTORY:      Allergies: Allergies    Mushrooms (Other)  Originally Entered as [Anaphylaxis] reaction to [Bee/Wasp Stings] (Unknown)  Demerol HCl (Other (Moderate))    Intolerances          REVIEW OF SYSTEMS:    As above  No chest pain or shortness of breath  No lightheadeness or syncope  No leg swelling  No palpitations  No claudication-like symptoms    Vital Signs Last 24 Hrs  T(C): 36.9 (27 Mar 2024 06:07), Max: 36.9 (27 Mar 2024 06:07)  T(F): 98.4 (27 Mar 2024 06:07), Max: 98.4 (27 Mar 2024 06:07)  HR: 81 (27 Mar 2024 06:07) (71 - 81)  BP: 116/64 (27 Mar 2024 06:07) (116/64 - 138/63)  BP(mean): 77 (27 Mar 2024 06:07) (77 - 86)  RR: 18 (27 Mar 2024 06:07) (18 - 20)  SpO2: 98% (27 Mar 2024 06:07) (95% - 100%)    Parameters below as of 27 Mar 2024 06:07  Patient On (Oxygen Delivery Method): room air        I&O's Summary      CAPILLARY BLOOD GLUCOSE          PHYSICAL EXAM:   Patient in NAD  Neck: No JVD; Carotids:  2+ without bruits  Respiratory:  Clear to A&P  Cardiovascular: S1 and S2, regular rate and rhythm, no Murmurs, gallops or rubs  Gastrointestinal:  Soft, non-tender; BS positive  Extremities: No peripheral edema  Vascular: 2+ peripheral pulses  Neurological: A/O x 3, no focal deficits      MEDICATIONS:  MEDICATIONS  (STANDING):  atorvastatin 40 milliGRAM(s) Oral at bedtime  dextrose 5%. 1000 milliLiter(s) (50 mL/Hr) IV Continuous <Continuous>  dextrose 50% Injectable 25 Gram(s) IV Push once  enoxaparin Injectable 40 milliGRAM(s) SubCutaneous every 24 hours  glucagon  Injectable 1 milliGRAM(s) IntraMuscular once  insulin lispro (ADMELOG) corrective regimen sliding scale   SubCutaneous three times a day before meals  levothyroxine 50 MICROGram(s) Oral daily  pantoprazole    Tablet 40 milliGRAM(s) Oral before breakfast  propranolol  milliGRAM(s) Oral daily    Home Medications:  atorvastatin 40 mg oral tablet: 1 tab(s) orally once a day (at bedtime) (26 Mar 2024 22:50)  Jardiance 10 mg oral tablet: 1 tab(s) orally once a day (26 Mar 2024 22:51)  lansoprazole 30 mg oral delayed release capsule: 1 cap(s) orally once a day (at bedtime) (26 Mar 2024 22:51)  levothyroxine 50 mcg (0.05 mg) oral tablet: 1 tablet orally once a day (26 Mar 2024 22:51)  metFORMIN 1000 mg oral tablet: 1 tablet orally once a day (in the morning) (26 Mar 2024 22:52)  metFORMIN 500 mg oral tablet: 1 tablet orally once a day (at bedtime) (26 Mar 2024 22:52)  Multiple Vitamins oral tablet: 1 tab(s) orally once a day (27 Mar 2024 00:03)  propranolol 120 mg oral capsule, extended release: 1 capsule, extended release orally once a day (at bedtime) (26 Mar 2024 22:53)        LABS: All Labs Reviewed:  Blood Culture:   BNP   CBC             WBC Count: 6.43 K/uL (03-26 @ 19:19)              Hemoglobin: 15.1 g/dL (03-26 @ 19:19)              Hematocrit: 49.6 % (03-26 @ 19:19)              Mean Cell Volume: 79.6 fl (03-26 @ 19:19)              Platelet Count - Automated: 87 K/uL (03-26 @ 19:19)                            Cardiac markers   Troponin I, High Sensitivity (03.26.24 @ 19:19) Troponin I, High Sensitivity Result: 6.55  Troponin I, High Sensitivity (03.27.24 @ 00:40) Troponin I, High Sensitivity Result: 6.86                                     Chems        Sodium: 139 mmol/L (03-26 @ 19:19)          Potassium: 3.8 mmol/L (03-26 @ 19:19)          Blood Urea Nitrogen: 16 mg/dL (03-26 @ 19:19)          Creatinine 1.27                  Protein Total: 8.6 gm/dL (03-26 @ 19:19)                  Calcium: 9.0 mg/dL (03-26 @ 19:19)                  Bilirubin Total: 0.7 mg/dL (03-26 @ 19:19)          Alanine Aminotransferase (ALT/SGPT): 31 U/L (03-26 @ 19:19)          Aspartate Aminotransferase (AST/SGOT): 30 U/L (03-26 @ 19:19)                 INR: 1.11 ratio (03-26 @ 19:19)             RADIOLOGY:  < from: CT Head No Cont (03.26.24 @ 21:23) >  IMPRESSION:  No acute hemorrhage, mass effect, or midline shift. If blurry vision   persists, consider further evaluation via dedicated ophthalmologic   assessment. If near syncope persists, consider further evaluation via MR   imaging to include DWIand ADC mapping techniques, provided there are no   contraindications.        --- End of Report ---    < end of copied text >      EKG:  Normal Ekg    Telemetry:  Sinus    ECHO:

## 2024-03-27 NOTE — H&P ADULT - HISTORY OF PRESENT ILLNESS
69 y/o male with PMHx of obesity, YESSI, right breast ca, GERD, cervical spinal stenosis treated by discectomy 5/23, HTN, hypothyroid, HLD, DM2 presents to the ED c/o generalized weakness. Went for Stress test yesterday morning, did well but blood pressure dropped. He was given some fluid and blood pressure improved and was discharged home. He went to the park with his grandchildren and noted to be very weak, with some palpitations and visual changes. He denies LOC. He was brought to the ER by ambulance. Currently, feels well, blood pressure at baseline.

## 2024-03-27 NOTE — DISCHARGE NOTE PROVIDER - NSDCCPCAREPLAN_GEN_ALL_CORE_FT
PRINCIPAL DISCHARGE DIAGNOSIS  Diagnosis: Near syncope  Assessment and Plan of Treatment: Your episode was at this time likely due to dehydration.  Please followup with your PCP and Cardiologist post discharge        SECONDARY DISCHARGE DIAGNOSES  Diagnosis: Hypotensive episode  Assessment and Plan of Treatment:

## 2024-03-27 NOTE — DISCHARGE NOTE NURSING/CASE MANAGEMENT/SOCIAL WORK - NSDCPEFALRISK_GEN_ALL_CORE
For information on Fall & Injury Prevention, visit: [email or web address] OR  [email or web address] OR  https://www.cdc.gov/steadi/patient.html

## 2024-03-27 NOTE — H&P ADULT - NSHPLABSRESULTS_GEN_ALL_CORE
LABS:                          15.1   6.43  )-----------( 87       ( 26 Mar 2024 19:19 )             49.6     03-26    139  |  108  |  16  ----------------------------<  99  3.8   |  22  |  1.27    Ca    9.0      26 Mar 2024 19:19    TPro  8.6<H>  /  Alb  4.1  /  TBili  0.7  /  DBili  x   /  AST  30  /  ALT  31  /  AlkPhos  115  03-26    CARDIAC MARKERS ( 26 Mar 2024 19:19 )  x     / x     / 105 U/L / x     / x          LIVER FUNCTIONS - ( 26 Mar 2024 19:19 )  Alb: 4.1 g/dL / Pro: 8.6 gm/dL / ALK PHOS: 115 U/L / ALT: 31 U/L / AST: 30 U/L / GGT: x           PT/INR - ( 26 Mar 2024 19:19 )   PT: 12.5 sec;   INR: 1.11 ratio   PTT - ( 26 Mar 2024 19:19 )  PTT:35.3 sec    Troponin 6 x 2    Urinalysis Basic - ( 27 Mar 2024 00:17 )  Color: Dark Yellow / Appearance: Clear / SG: >1.030 / pH: x  Gluc: x / Ketone: 15 mg/dL  / Bili: Negative / Urobili: 0.2 mg/dL   Blood: x / Protein: Negative mg/dL / Nitrite: Negative   Leuk Esterase: Negative / RBC: x / WBC x   Sq Epi: x / Non Sq Epi: x / Bacteria: x    : US Duplex Venous Lower Ext Ltd, Left (03.26.24 @ 21:50) >    IMPRESSION:  No evidence of left lower extremity deep venous thrombosis.      CT Head No Cont (03.26.24 @ 21:23) >    IMPRESSION:  No acute hemorrhage, mass effect, or midline shift. If blurry vision   persists, consider further evaluation via dedicated ophthalmologic   assessment. If near syncope persists, consider further evaluation via MR   imaging to include DWIand ADC mapping techniques, provided there are no   contraindications.

## 2024-03-27 NOTE — DISCHARGE NOTE PROVIDER - DATE OF DISCHARGE SERVICE:
Last: 2/2/17  Next: 2/21/18  Last refill:    Pt requesting 90 day supply - lamotrigine 200mg tab - take 2 daily - 60x11 - 12/2/16   27-Mar-2024

## 2024-03-27 NOTE — DISCHARGE NOTE NURSING/CASE MANAGEMENT/SOCIAL WORK - PATIENT PORTAL LINK FT
You can access the FollowMyHealth Patient Portal offered by [facility] by registering at the following website: [email or web address]. By joining [facility]’s FollowMyHealth portal, you will also be able to view your health information using other applications (apps) compatible with our system.

## 2024-03-27 NOTE — H&P ADULT - NSHPPHYSICALEXAM_GEN_ALL_CORE
Vital Signs Last 24 Hrs  T(C): 36.8 (27 Mar 2024 01:13), Max: 36.8 (27 Mar 2024 01:13)  T(F): 98.2 (27 Mar 2024 01:13), Max: 98.2 (27 Mar 2024 01:13)  HR: 71 (27 Mar 2024 02:03) (71 - 73)  BP: 126/63 (27 Mar 2024 02:03) (126/63 - 138/63)  BP(mean): 79 (27 Mar 2024 02:03) (79 - 86)  RR: 18 (27 Mar 2024 02:03) (18 - 20)  SpO2: 96% (27 Mar 2024 02:03) (95% - 100%)    PHYSICAL EXAM:  GENERAL: NAD, well-groomed, well-developed  HEENT - NC/AT, pupils equal and reactive to light,  ; Moist mucous membranes, Good dentition, No lesions  NECK: Supple, No JVD  CHEST/LUNG: Clear to auscultation bilaterally; No rales, rhonchi, wheezing  HEART: Regular rate and rhythm; No murmurs, rubs, or gallops  ABDOMEN: Soft, Nontender, Nondistended; Bowel sounds present  EXTREMITIES:  2+ Peripheral Pulses, No clubbing, cyanosis, or edema  NEURO:  No Focal deficits, sensory and motor intact  SKIN: No rashes or lesions

## 2024-03-27 NOTE — ED ADULT NURSE REASSESSMENT NOTE - NS ED NURSE REASSESS COMMENT FT1
Received report from Damaso OLVERA. Pt resting in stretcher with safety maintained. Respirations even and unlabored, no distress noted. Pt awaiting bed on unit, pt updated on plan of care.

## 2024-03-27 NOTE — CONSULT NOTE ADULT - ASSESSMENT
68 year old man with the above history admitted with weakness/near syncope after having a nuclear stress test at my office yesterday morning.  The nuclear stress test was normal.  He appears stable from a cardiac standpoint with a stable BP, normal Ekg, and normal cardiac enzymes.  I feel he can be discharged home and follow up in my office.

## 2024-04-03 DIAGNOSIS — Z85.3 PERSONAL HISTORY OF MALIGNANT NEOPLASM OF BREAST: ICD-10-CM

## 2024-04-03 DIAGNOSIS — I10 ESSENTIAL (PRIMARY) HYPERTENSION: ICD-10-CM

## 2024-04-03 DIAGNOSIS — G47.33 OBSTRUCTIVE SLEEP APNEA (ADULT) (PEDIATRIC): ICD-10-CM

## 2024-04-03 DIAGNOSIS — E11.9 TYPE 2 DIABETES MELLITUS WITHOUT COMPLICATIONS: ICD-10-CM

## 2024-04-03 DIAGNOSIS — E86.0 DEHYDRATION: ICD-10-CM

## 2024-04-03 DIAGNOSIS — I95.9 HYPOTENSION, UNSPECIFIED: ICD-10-CM

## 2024-04-03 DIAGNOSIS — D69.6 THROMBOCYTOPENIA, UNSPECIFIED: ICD-10-CM

## 2024-04-03 DIAGNOSIS — E78.5 HYPERLIPIDEMIA, UNSPECIFIED: ICD-10-CM

## 2024-04-03 DIAGNOSIS — E03.9 HYPOTHYROIDISM, UNSPECIFIED: ICD-10-CM

## 2024-04-03 DIAGNOSIS — E66.01 MORBID (SEVERE) OBESITY DUE TO EXCESS CALORIES: ICD-10-CM

## 2024-07-07 ENCOUNTER — NON-APPOINTMENT (OUTPATIENT)
Age: 69
End: 2024-07-07

## 2024-11-22 ENCOUNTER — NON-APPOINTMENT (OUTPATIENT)
Age: 69
End: 2024-11-22